# Patient Record
Sex: MALE | Employment: OTHER
[De-identification: names, ages, dates, MRNs, and addresses within clinical notes are randomized per-mention and may not be internally consistent; named-entity substitution may affect disease eponyms.]

---

## 2022-07-30 ENCOUNTER — TELEPHONE ENCOUNTER (OUTPATIENT)
Age: 87
End: 2022-07-30

## 2022-07-30 RX ORDER — SUCRALFATE 1 G/1
1 (ONE) TABLET ORAL
Qty: 0 | Refills: 4 | OUTPATIENT
Start: 2017-02-23 | End: 2017-03-01

## 2022-07-31 ENCOUNTER — TELEPHONE ENCOUNTER (OUTPATIENT)
Age: 87
End: 2022-07-31

## 2022-07-31 RX ORDER — SUCRALFATE 1 G/1
1 (ONE) TABLET ORAL
Qty: 0 | Refills: 4 | Status: ACTIVE | COMMUNITY
Start: 2017-03-01

## 2022-07-31 RX ORDER — SUCRALFATE 1 G/1
1 (ONE) TABLET ORAL
Qty: 0 | Refills: 4 | Status: ACTIVE | COMMUNITY
Start: 2017-02-23

## 2022-07-31 RX ORDER — FAMOTIDINE 10 MG
1 (ONE) TABLET ORAL
Qty: 0 | Refills: 16 | Status: ACTIVE | COMMUNITY
Start: 2017-03-01

## 2023-12-23 ENCOUNTER — APPOINTMENT (OUTPATIENT)
Dept: CARDIOLOGY | Facility: HOSPITAL | Age: 88
End: 2023-12-23
Payer: MEDICARE

## 2023-12-23 ENCOUNTER — HOSPITAL ENCOUNTER (OUTPATIENT)
Facility: HOSPITAL | Age: 88
Setting detail: OBSERVATION
Discharge: HOME | End: 2023-12-24
Attending: EMERGENCY MEDICINE | Admitting: INTERNAL MEDICINE
Payer: MEDICARE

## 2023-12-23 ENCOUNTER — APPOINTMENT (OUTPATIENT)
Dept: RADIOLOGY | Facility: HOSPITAL | Age: 88
End: 2023-12-23
Payer: MEDICARE

## 2023-12-23 DIAGNOSIS — I48.0 PAROXYSMAL ATRIAL FIBRILLATION (MULTI): ICD-10-CM

## 2023-12-23 DIAGNOSIS — R07.9 CHEST PAIN, UNSPECIFIED TYPE: Primary | ICD-10-CM

## 2023-12-23 DIAGNOSIS — R07.89 OTHER CHEST PAIN: ICD-10-CM

## 2023-12-23 LAB
ALBUMIN SERPL BCP-MCNC: 4.3 G/DL (ref 3.4–5)
ALP SERPL-CCNC: 44 U/L (ref 33–136)
ALT SERPL W P-5'-P-CCNC: 15 U/L (ref 10–52)
ANION GAP SERPL CALC-SCNC: 15 MMOL/L (ref 10–20)
AST SERPL W P-5'-P-CCNC: 26 U/L (ref 9–39)
BASOPHILS # BLD AUTO: 0.06 X10*3/UL (ref 0–0.1)
BASOPHILS NFR BLD AUTO: 1 %
BILIRUB SERPL-MCNC: 0.6 MG/DL (ref 0–1.2)
BNP SERPL-MCNC: 180 PG/ML (ref 0–99)
BUN SERPL-MCNC: 31 MG/DL (ref 6–23)
CALCIUM SERPL-MCNC: 9.2 MG/DL (ref 8.6–10.3)
CARDIAC TROPONIN I PNL SERPL HS: 6 NG/L (ref 0–20)
CARDIAC TROPONIN I PNL SERPL HS: 6 NG/L (ref 0–20)
CHLORIDE SERPL-SCNC: 102 MMOL/L (ref 98–107)
CO2 SERPL-SCNC: 24 MMOL/L (ref 21–32)
CREAT SERPL-MCNC: 1.47 MG/DL (ref 0.5–1.3)
D DIMER PPP FEU-MCNC: 428 NG/ML FEU
EOSINOPHIL # BLD AUTO: 0.28 X10*3/UL (ref 0–0.4)
EOSINOPHIL NFR BLD AUTO: 4.9 %
ERYTHROCYTE [DISTWIDTH] IN BLOOD BY AUTOMATED COUNT: 13.9 % (ref 11.5–14.5)
GFR SERPL CREATININE-BSD FRML MDRD: 46 ML/MIN/1.73M*2
GLUCOSE BLD MANUAL STRIP-MCNC: 221 MG/DL (ref 74–99)
GLUCOSE SERPL-MCNC: 143 MG/DL (ref 74–99)
HCT VFR BLD AUTO: 33.7 % (ref 41–52)
HGB BLD-MCNC: 10.9 G/DL (ref 13.5–17.5)
IMM GRANULOCYTES # BLD AUTO: 0.06 X10*3/UL (ref 0–0.5)
IMM GRANULOCYTES NFR BLD AUTO: 1 % (ref 0–0.9)
LIPASE SERPL-CCNC: 11 U/L (ref 9–82)
LYMPHOCYTES # BLD AUTO: 1.14 X10*3/UL (ref 0.8–3)
LYMPHOCYTES NFR BLD AUTO: 19.9 %
MAGNESIUM SERPL-MCNC: 1.8 MG/DL (ref 1.6–2.4)
MCH RBC QN AUTO: 30.1 PG (ref 26–34)
MCHC RBC AUTO-ENTMCNC: 32.3 G/DL (ref 32–36)
MCV RBC AUTO: 93 FL (ref 80–100)
MONOCYTES # BLD AUTO: 0.49 X10*3/UL (ref 0.05–0.8)
MONOCYTES NFR BLD AUTO: 8.6 %
NEUTROPHILS # BLD AUTO: 3.69 X10*3/UL (ref 1.6–5.5)
NEUTROPHILS NFR BLD AUTO: 64.6 %
NRBC BLD-RTO: 0 /100 WBCS (ref 0–0)
PLATELET # BLD AUTO: 229 X10*3/UL (ref 150–450)
POTASSIUM SERPL-SCNC: 4 MMOL/L (ref 3.5–5.3)
PROT SERPL-MCNC: 7.4 G/DL (ref 6.4–8.2)
RBC # BLD AUTO: 3.62 X10*6/UL (ref 4.5–5.9)
SODIUM SERPL-SCNC: 137 MMOL/L (ref 136–145)
WBC # BLD AUTO: 5.7 X10*3/UL (ref 4.4–11.3)

## 2023-12-23 PROCEDURE — 85025 COMPLETE CBC W/AUTO DIFF WBC: CPT | Performed by: EMERGENCY MEDICINE

## 2023-12-23 PROCEDURE — 99285 EMERGENCY DEPT VISIT HI MDM: CPT | Performed by: EMERGENCY MEDICINE

## 2023-12-23 PROCEDURE — 93005 ELECTROCARDIOGRAM TRACING: CPT

## 2023-12-23 PROCEDURE — 71045 X-RAY EXAM CHEST 1 VIEW: CPT | Performed by: RADIOLOGY

## 2023-12-23 PROCEDURE — 2500000004 HC RX 250 GENERAL PHARMACY W/ HCPCS (ALT 636 FOR OP/ED): Performed by: PHYSICIAN ASSISTANT

## 2023-12-23 PROCEDURE — 82947 ASSAY GLUCOSE BLOOD QUANT: CPT | Mod: 59

## 2023-12-23 PROCEDURE — 83880 ASSAY OF NATRIURETIC PEPTIDE: CPT | Performed by: PHYSICIAN ASSISTANT

## 2023-12-23 PROCEDURE — 84484 ASSAY OF TROPONIN QUANT: CPT | Performed by: EMERGENCY MEDICINE

## 2023-12-23 PROCEDURE — 2500000001 HC RX 250 WO HCPCS SELF ADMINISTERED DRUGS (ALT 637 FOR MEDICARE OP): Performed by: PHYSICIAN ASSISTANT

## 2023-12-23 PROCEDURE — 71045 X-RAY EXAM CHEST 1 VIEW: CPT

## 2023-12-23 PROCEDURE — 36415 COLL VENOUS BLD VENIPUNCTURE: CPT | Performed by: EMERGENCY MEDICINE

## 2023-12-23 PROCEDURE — 99223 1ST HOSP IP/OBS HIGH 75: CPT | Performed by: PHYSICIAN ASSISTANT

## 2023-12-23 PROCEDURE — 80053 COMPREHEN METABOLIC PANEL: CPT | Performed by: EMERGENCY MEDICINE

## 2023-12-23 PROCEDURE — 83735 ASSAY OF MAGNESIUM: CPT | Performed by: PHYSICIAN ASSISTANT

## 2023-12-23 PROCEDURE — 84484 ASSAY OF TROPONIN QUANT: CPT | Mod: 91 | Performed by: EMERGENCY MEDICINE

## 2023-12-23 PROCEDURE — 83690 ASSAY OF LIPASE: CPT | Performed by: EMERGENCY MEDICINE

## 2023-12-23 PROCEDURE — 85379 FIBRIN DEGRADATION QUANT: CPT | Performed by: PHYSICIAN ASSISTANT

## 2023-12-23 PROCEDURE — G0378 HOSPITAL OBSERVATION PER HR: HCPCS

## 2023-12-23 PROCEDURE — 2500000002 HC RX 250 W HCPCS SELF ADMINISTERED DRUGS (ALT 637 FOR MEDICARE OP, ALT 636 FOR OP/ED): Mod: MUE | Performed by: PHYSICIAN ASSISTANT

## 2023-12-23 RX ORDER — PIOGLITAZONEHYDROCHLORIDE 15 MG/1
45 TABLET ORAL DAILY
Status: DISCONTINUED | OUTPATIENT
Start: 2023-12-24 | End: 2023-12-24 | Stop reason: HOSPADM

## 2023-12-23 RX ORDER — TRAZODONE HYDROCHLORIDE 50 MG/1
50 TABLET ORAL NIGHTLY
Status: DISCONTINUED | OUTPATIENT
Start: 2023-12-23 | End: 2023-12-24 | Stop reason: HOSPADM

## 2023-12-23 RX ORDER — AMLODIPINE BESYLATE 10 MG/1
10 TABLET ORAL DAILY
Status: DISCONTINUED | OUTPATIENT
Start: 2023-12-23 | End: 2023-12-23

## 2023-12-23 RX ORDER — LOSARTAN POTASSIUM 50 MG/1
100 TABLET ORAL DAILY
Status: DISCONTINUED | OUTPATIENT
Start: 2023-12-23 | End: 2023-12-24 | Stop reason: HOSPADM

## 2023-12-23 RX ORDER — MONTELUKAST SODIUM 10 MG/1
10 TABLET ORAL NIGHTLY
Status: DISCONTINUED | OUTPATIENT
Start: 2023-12-23 | End: 2023-12-24 | Stop reason: HOSPADM

## 2023-12-23 RX ORDER — LOSARTAN POTASSIUM 50 MG/1
100 TABLET ORAL DAILY
COMMUNITY

## 2023-12-23 RX ORDER — TRAZODONE HYDROCHLORIDE 50 MG/1
50 TABLET ORAL NIGHTLY
COMMUNITY

## 2023-12-23 RX ORDER — CARVEDILOL 25 MG/1
25 TABLET ORAL
COMMUNITY

## 2023-12-23 RX ORDER — FENOFIBRATE 145 MG/1
145 TABLET, FILM COATED ORAL DAILY
COMMUNITY

## 2023-12-23 RX ORDER — ASPIRIN 81 MG/1
81 TABLET ORAL DAILY
Status: DISCONTINUED | OUTPATIENT
Start: 2023-12-23 | End: 2023-12-24 | Stop reason: HOSPADM

## 2023-12-23 RX ORDER — ONDANSETRON 4 MG/1
4 TABLET, ORALLY DISINTEGRATING ORAL EVERY 8 HOURS PRN
Status: DISCONTINUED | OUTPATIENT
Start: 2023-12-23 | End: 2023-12-24 | Stop reason: HOSPADM

## 2023-12-23 RX ORDER — INSULIN LISPRO 100 [IU]/ML
0-5 INJECTION, SOLUTION INTRAVENOUS; SUBCUTANEOUS
Status: DISCONTINUED | OUTPATIENT
Start: 2023-12-23 | End: 2023-12-24 | Stop reason: HOSPADM

## 2023-12-23 RX ORDER — TALC
3 POWDER (GRAM) TOPICAL DAILY
Status: DISCONTINUED | OUTPATIENT
Start: 2023-12-23 | End: 2023-12-24 | Stop reason: HOSPADM

## 2023-12-23 RX ORDER — FUROSEMIDE 20 MG/1
20 TABLET ORAL DAILY
COMMUNITY

## 2023-12-23 RX ORDER — LEVOTHYROXINE SODIUM 88 UG/1
88 TABLET ORAL
Status: DISCONTINUED | OUTPATIENT
Start: 2023-12-24 | End: 2023-12-24 | Stop reason: HOSPADM

## 2023-12-23 RX ORDER — DEXTROSE 50 % IN WATER (D50W) INTRAVENOUS SYRINGE
25
Status: DISCONTINUED | OUTPATIENT
Start: 2023-12-23 | End: 2023-12-24 | Stop reason: HOSPADM

## 2023-12-23 RX ORDER — DEXTROSE MONOHYDRATE 100 MG/ML
0.3 INJECTION, SOLUTION INTRAVENOUS ONCE AS NEEDED
Status: DISCONTINUED | OUTPATIENT
Start: 2023-12-23 | End: 2023-12-24 | Stop reason: HOSPADM

## 2023-12-23 RX ORDER — CARVEDILOL 25 MG/1
25 TABLET ORAL
Status: DISCONTINUED | OUTPATIENT
Start: 2023-12-23 | End: 2023-12-24 | Stop reason: HOSPADM

## 2023-12-23 RX ORDER — DULOXETIN HYDROCHLORIDE 30 MG/1
30 CAPSULE, DELAYED RELEASE ORAL DAILY
Status: DISCONTINUED | OUTPATIENT
Start: 2023-12-24 | End: 2023-12-24 | Stop reason: HOSPADM

## 2023-12-23 RX ORDER — CLONIDINE HYDROCHLORIDE 0.2 MG/1
0.2 TABLET ORAL 2 TIMES DAILY
Status: DISCONTINUED | OUTPATIENT
Start: 2023-12-23 | End: 2023-12-24 | Stop reason: HOSPADM

## 2023-12-23 RX ORDER — ENOXAPARIN SODIUM 100 MG/ML
40 INJECTION SUBCUTANEOUS EVERY 24 HOURS
Status: DISCONTINUED | OUTPATIENT
Start: 2023-12-23 | End: 2023-12-24 | Stop reason: HOSPADM

## 2023-12-23 RX ORDER — PANTOPRAZOLE SODIUM 40 MG/10ML
40 INJECTION, POWDER, LYOPHILIZED, FOR SOLUTION INTRAVENOUS
Status: DISCONTINUED | OUTPATIENT
Start: 2023-12-24 | End: 2023-12-24 | Stop reason: HOSPADM

## 2023-12-23 RX ORDER — INSULIN GLARGINE 100 [IU]/ML
25 INJECTION, SOLUTION SUBCUTANEOUS NIGHTLY
COMMUNITY

## 2023-12-23 RX ORDER — FENOFIBRATE 160 MG/1
160 TABLET ORAL DAILY
Status: DISCONTINUED | OUTPATIENT
Start: 2023-12-24 | End: 2023-12-24 | Stop reason: HOSPADM

## 2023-12-23 RX ORDER — INSULIN GLARGINE 100 [IU]/ML
10 INJECTION, SOLUTION SUBCUTANEOUS NIGHTLY
Status: DISCONTINUED | OUTPATIENT
Start: 2023-12-23 | End: 2023-12-24 | Stop reason: HOSPADM

## 2023-12-23 RX ORDER — ACETAMINOPHEN 325 MG/1
650 TABLET ORAL EVERY 4 HOURS PRN
Status: DISCONTINUED | OUTPATIENT
Start: 2023-12-23 | End: 2023-12-24 | Stop reason: HOSPADM

## 2023-12-23 RX ORDER — AMIODARONE HYDROCHLORIDE 200 MG/1
200 TABLET ORAL DAILY
Status: DISCONTINUED | OUTPATIENT
Start: 2023-12-23 | End: 2023-12-24 | Stop reason: HOSPADM

## 2023-12-23 RX ORDER — ACETAMINOPHEN 650 MG/1
650 SUPPOSITORY RECTAL EVERY 4 HOURS PRN
Status: DISCONTINUED | OUTPATIENT
Start: 2023-12-23 | End: 2023-12-24 | Stop reason: HOSPADM

## 2023-12-23 RX ORDER — AMLODIPINE BESYLATE 10 MG/1
10 TABLET ORAL DAILY
COMMUNITY

## 2023-12-23 RX ORDER — LANOLIN ALCOHOL/MO/W.PET/CERES
400 CREAM (GRAM) TOPICAL DAILY
Status: DISCONTINUED | OUTPATIENT
Start: 2023-12-23 | End: 2023-12-24 | Stop reason: HOSPADM

## 2023-12-23 RX ORDER — AMLODIPINE BESYLATE 10 MG/1
10 TABLET ORAL DAILY
Status: DISCONTINUED | OUTPATIENT
Start: 2023-12-24 | End: 2023-12-24 | Stop reason: HOSPADM

## 2023-12-23 RX ORDER — CLONIDINE HYDROCHLORIDE 0.2 MG/1
0.2 TABLET ORAL 2 TIMES DAILY
COMMUNITY

## 2023-12-23 RX ORDER — PANTOPRAZOLE SODIUM 40 MG/1
40 TABLET, DELAYED RELEASE ORAL
Status: DISCONTINUED | OUTPATIENT
Start: 2023-12-24 | End: 2023-12-24 | Stop reason: HOSPADM

## 2023-12-23 RX ORDER — LEVOTHYROXINE SODIUM 88 UG/1
88 CAPSULE ORAL
COMMUNITY

## 2023-12-23 RX ORDER — ONDANSETRON HYDROCHLORIDE 2 MG/ML
4 INJECTION, SOLUTION INTRAVENOUS EVERY 8 HOURS PRN
Status: DISCONTINUED | OUTPATIENT
Start: 2023-12-23 | End: 2023-12-24 | Stop reason: HOSPADM

## 2023-12-23 RX ORDER — DULOXETIN HYDROCHLORIDE 30 MG/1
30 CAPSULE, DELAYED RELEASE ORAL DAILY
COMMUNITY

## 2023-12-23 RX ORDER — POLYETHYLENE GLYCOL 3350 17 G/17G
17 POWDER, FOR SOLUTION ORAL DAILY
Status: DISCONTINUED | OUTPATIENT
Start: 2023-12-23 | End: 2023-12-24 | Stop reason: HOSPADM

## 2023-12-23 RX ORDER — AMIODARONE HYDROCHLORIDE 200 MG/1
200 TABLET ORAL DAILY
COMMUNITY

## 2023-12-23 RX ORDER — FUROSEMIDE 20 MG/1
20 TABLET ORAL DAILY
Status: DISCONTINUED | OUTPATIENT
Start: 2023-12-23 | End: 2023-12-24 | Stop reason: HOSPADM

## 2023-12-23 RX ORDER — ACETAMINOPHEN 160 MG/5ML
650 SOLUTION ORAL EVERY 4 HOURS PRN
Status: DISCONTINUED | OUTPATIENT
Start: 2023-12-23 | End: 2023-12-24 | Stop reason: HOSPADM

## 2023-12-23 RX ORDER — PIOGLITAZONEHYDROCHLORIDE 45 MG/1
45 TABLET ORAL DAILY
COMMUNITY

## 2023-12-23 RX ORDER — POTASSIUM CHLORIDE 20 MEQ/1
20 TABLET, EXTENDED RELEASE ORAL DAILY
COMMUNITY

## 2023-12-23 RX ORDER — ASPIRIN 81 MG/1
81 TABLET ORAL DAILY
COMMUNITY

## 2023-12-23 RX ORDER — POTASSIUM CHLORIDE 20 MEQ/1
20 TABLET, EXTENDED RELEASE ORAL DAILY
Status: DISCONTINUED | OUTPATIENT
Start: 2023-12-24 | End: 2023-12-24 | Stop reason: HOSPADM

## 2023-12-23 RX ORDER — MONTELUKAST SODIUM 10 MG/1
10 TABLET ORAL NIGHTLY
COMMUNITY

## 2023-12-23 RX ADMIN — AMIODARONE HYDROCHLORIDE 200 MG: 200 TABLET ORAL at 21:00

## 2023-12-23 RX ADMIN — POLYETHYLENE GLYCOL 3350 17 G: 17 POWDER, FOR SOLUTION ORAL at 20:58

## 2023-12-23 RX ADMIN — Medication 400 MG: at 20:59

## 2023-12-23 RX ADMIN — TRAZODONE HYDROCHLORIDE 50 MG: 50 TABLET ORAL at 20:59

## 2023-12-23 RX ADMIN — MONTELUKAST SODIUM 10 MG: 10 TABLET, FILM COATED ORAL at 21:00

## 2023-12-23 RX ADMIN — Medication 3 MG: at 20:59

## 2023-12-23 RX ADMIN — CARVEDILOL 25 MG: 25 TABLET, FILM COATED ORAL at 21:00

## 2023-12-23 RX ADMIN — CLONIDINE HYDROCHLORIDE 0.2 MG: 0.2 TABLET ORAL at 20:59

## 2023-12-23 RX ADMIN — INSULIN GLARGINE 10 UNITS: 100 INJECTION, SOLUTION SUBCUTANEOUS at 22:11

## 2023-12-23 RX ADMIN — ASPIRIN 81 MG: 81 TABLET, COATED ORAL at 20:59

## 2023-12-23 RX ADMIN — FUROSEMIDE 20 MG: 20 TABLET ORAL at 21:00

## 2023-12-23 SDOH — SOCIAL STABILITY: SOCIAL INSECURITY: WERE YOU ABLE TO COMPLETE ALL THE BEHAVIORAL HEALTH SCREENINGS?: YES

## 2023-12-23 SDOH — SOCIAL STABILITY: SOCIAL INSECURITY: HAVE YOU HAD THOUGHTS OF HARMING ANYONE ELSE?: NO

## 2023-12-23 SDOH — SOCIAL STABILITY: SOCIAL INSECURITY: ABUSE: ADULT

## 2023-12-23 SDOH — SOCIAL STABILITY: SOCIAL INSECURITY: DO YOU FEEL ANYONE HAS EXPLOITED OR TAKEN ADVANTAGE OF YOU FINANCIALLY OR OF YOUR PERSONAL PROPERTY?: NO

## 2023-12-23 SDOH — SOCIAL STABILITY: SOCIAL INSECURITY: HAS ANYONE EVER THREATENED TO HURT YOUR FAMILY OR YOUR PETS?: NO

## 2023-12-23 SDOH — SOCIAL STABILITY: SOCIAL INSECURITY: ARE THERE ANY APPARENT SIGNS OF INJURIES/BEHAVIORS THAT COULD BE RELATED TO ABUSE/NEGLECT?: NO

## 2023-12-23 SDOH — SOCIAL STABILITY: SOCIAL INSECURITY: DOES ANYONE TRY TO KEEP YOU FROM HAVING/CONTACTING OTHER FRIENDS OR DOING THINGS OUTSIDE YOUR HOME?: NO

## 2023-12-23 SDOH — SOCIAL STABILITY: SOCIAL INSECURITY: ARE YOU OR HAVE YOU BEEN THREATENED OR ABUSED PHYSICALLY, EMOTIONALLY, OR SEXUALLY BY ANYONE?: NO

## 2023-12-23 SDOH — SOCIAL STABILITY: SOCIAL INSECURITY: DO YOU FEEL UNSAFE GOING BACK TO THE PLACE WHERE YOU ARE LIVING?: NO

## 2023-12-23 ASSESSMENT — PAIN - FUNCTIONAL ASSESSMENT
PAIN_FUNCTIONAL_ASSESSMENT: 0-10

## 2023-12-23 ASSESSMENT — LIFESTYLE VARIABLES
HOW OFTEN DO YOU HAVE 6 OR MORE DRINKS ON ONE OCCASION: NEVER
HOW OFTEN DO YOU HAVE A DRINK CONTAINING ALCOHOL: NEVER
AUDIT-C TOTAL SCORE: 0
SUBSTANCE_ABUSE_PAST_12_MONTHS: NO
HOW MANY STANDARD DRINKS CONTAINING ALCOHOL DO YOU HAVE ON A TYPICAL DAY: PATIENT DOES NOT DRINK
AUDIT-C TOTAL SCORE: 0
PRESCIPTION_ABUSE_PAST_12_MONTHS: NO
SKIP TO QUESTIONS 9-10: 1

## 2023-12-23 ASSESSMENT — COGNITIVE AND FUNCTIONAL STATUS - GENERAL
WALKING IN HOSPITAL ROOM: A LITTLE
PATIENT BASELINE BEDBOUND: NO
CLIMB 3 TO 5 STEPS WITH RAILING: A LITTLE
DAILY ACTIVITIY SCORE: 23
MOBILITY SCORE: 22
MOBILITY SCORE: 22
DAILY ACTIVITIY SCORE: 23
DRESSING REGULAR LOWER BODY CLOTHING: A LITTLE
DRESSING REGULAR LOWER BODY CLOTHING: A LITTLE
WALKING IN HOSPITAL ROOM: A LITTLE
CLIMB 3 TO 5 STEPS WITH RAILING: A LITTLE

## 2023-12-23 ASSESSMENT — ENCOUNTER SYMPTOMS
DIZZINESS: 0
PALPITATIONS: 0
ABDOMINAL PAIN: 0
WHEEZING: 0
CHILLS: 0
COUGH: 0
CHEST TIGHTNESS: 0
NAUSEA: 0
DIARRHEA: 0
HEADACHES: 0
DIFFICULTY URINATING: 0
FEVER: 0
SHORTNESS OF BREATH: 0
SORE THROAT: 0
VOMITING: 0

## 2023-12-23 ASSESSMENT — COLUMBIA-SUICIDE SEVERITY RATING SCALE - C-SSRS
2. HAVE YOU ACTUALLY HAD ANY THOUGHTS OF KILLING YOURSELF?: NO
1. IN THE PAST MONTH, HAVE YOU WISHED YOU WERE DEAD OR WISHED YOU COULD GO TO SLEEP AND NOT WAKE UP?: NO
6. HAVE YOU EVER DONE ANYTHING, STARTED TO DO ANYTHING, OR PREPARED TO DO ANYTHING TO END YOUR LIFE?: NO

## 2023-12-23 ASSESSMENT — PATIENT HEALTH QUESTIONNAIRE - PHQ9
SUM OF ALL RESPONSES TO PHQ9 QUESTIONS 1 & 2: 0
1. LITTLE INTEREST OR PLEASURE IN DOING THINGS: NOT AT ALL
2. FEELING DOWN, DEPRESSED OR HOPELESS: NOT AT ALL

## 2023-12-23 ASSESSMENT — ACTIVITIES OF DAILY LIVING (ADL)
LACK_OF_TRANSPORTATION: NO
DRESSING YOURSELF: INDEPENDENT
BATHING: INDEPENDENT
HEARING - RIGHT EAR: HEARING AID
WALKS IN HOME: INDEPENDENT
JUDGMENT_ADEQUATE_SAFELY_COMPLETE_DAILY_ACTIVITIES: YES
FEEDING YOURSELF: INDEPENDENT
GROOMING: INDEPENDENT
HEARING - LEFT EAR: HEARING AID
PATIENT'S MEMORY ADEQUATE TO SAFELY COMPLETE DAILY ACTIVITIES?: YES
TOILETING: INDEPENDENT
ADEQUATE_TO_COMPLETE_ADL: NO

## 2023-12-23 ASSESSMENT — PAIN SCALES - GENERAL
PAINLEVEL_OUTOF10: 0 - NO PAIN
PAINLEVEL_OUTOF10: 2
PAINLEVEL_OUTOF10: 2
PAINLEVEL_OUTOF10: 0 - NO PAIN

## 2023-12-23 ASSESSMENT — PAIN DESCRIPTION - DESCRIPTORS
DESCRIPTORS: THROBBING
DESCRIPTORS: THROBBING

## 2023-12-23 NOTE — CARE PLAN
Problem: Pain  Goal: My pain/discomfort is manageable  Outcome: Progressing     Problem: Safety  Goal: Patient will be injury free during hospitalization  Outcome: Progressing  Goal: I will remain free of falls  Outcome: Progressing     Problem: Daily Care  Goal: Daily care needs are met  Outcome: Progressing     Problem: Psychosocial Needs  Goal: Demonstrates ability to cope with hospitalization/illness  Outcome: Progressing  Goal: Collaborate with me, my family, and caregiver to identify my specific goals  Outcome: Progressing  Flowsheets (Taken 12/23/2023 1072)  Cultural Requests During Hospitalization: n/a  Spiritual Requests During Hospitalization: n/a     Problem: Discharge Barriers  Goal: My discharge needs are met  Outcome: Progressing   The patient's goals for the shift include      The clinical goals for the shift include Remain free from falls

## 2023-12-23 NOTE — ED NOTES
Pt BIBA with the substernal chest pressure. Pt was given nitroglycerin tablet and 324mg ASA. Pt to room 22, placed on cardiac monitor with cycling BP's and continuous pulse oximetry. EKG completed at bedside, blood work sent to lab.      Jaun Rodriguez RN  12/23/23 0437

## 2023-12-23 NOTE — CARE PLAN
The patient's goals for the shift include  pt. Will be cp free this shift.    The clinical goals for the shift include Remain free from falls this shift.  Pt. Is a new admission to the unit. Oriented to room, and bed controls. . Resting in bed.

## 2023-12-23 NOTE — H&P
History Of Present Illness  Sonny Heart is a 88 y.o. male PMHx HTN, pAF s/p PPM, HFpEF, GERD, DM2, HLD, CKD3 presenting with chest pain. Patient reports intermittent substernal chest pain x 2 weeks which worsened acute overnight. Unclear trigger. He reports that this is not caused by activity, feels the pain at rest. Not worse with lying down. He reports the pain has become more persistent. Denies fall or new exercises. Denies pleuritic chest pain. He denies dizziness or palpitations. Has been eating and drinking normally. Denies fever, chills, nausea, vomiting. Dneies neck/jaw/arm pain.    ED workup: EKG paced rhythm, not ST T wave abnormalities disgnostic for ischemia; CXR no acute cardiopulmonary abnormality; trop 6, 6; CBC no leukocytosis, Hgb 10.9, no thrombocytopenia; CMP Cr 1.47 (1.5-1.7), LFTs wnl, electrolytes wnl; lipase 11    Reviewed  Nuclear stress 5/2022 normal myocardial study without evidence of ischemia.   ECHO 11/11/2022 LVEF 60-65%, elevated RSVP     Past Medical History  No past medical history on file.    Surgical History  No past surgical history on file.     Social History  He has no history on file for tobacco use, alcohol use, and drug use.    Family History  No family history on file.     Allergies  Patient has no allergy information on record.    Review of Systems   Constitutional:  Negative for chills and fever.   HENT:  Negative for congestion and sore throat.    Eyes:  Negative for visual disturbance.   Respiratory:  Negative for cough, chest tightness, shortness of breath and wheezing.    Cardiovascular:  Positive for chest pain. Negative for palpitations and leg swelling.   Gastrointestinal:  Negative for abdominal pain, diarrhea, nausea and vomiting.   Genitourinary:  Negative for difficulty urinating.   Neurological:  Negative for dizziness and headaches.        Physical Exam  Constitutional:       General: He is not in acute distress.     Appearance: He is not toxic-appearing.    HENT:      Head: Normocephalic and atraumatic.      Right Ear: External ear normal.      Left Ear: External ear normal.      Nose: Nose normal. No congestion.      Mouth/Throat:      Mouth: Mucous membranes are moist.      Pharynx: Oropharynx is clear.   Eyes:      General:         Right eye: No discharge.         Left eye: No discharge.      Conjunctiva/sclera: Conjunctivae normal.      Pupils: Pupils are equal, round, and reactive to light.   Cardiovascular:      Rate and Rhythm: Normal rate and regular rhythm.      Heart sounds: No murmur heard.     No gallop.   Pulmonary:      Effort: Pulmonary effort is normal.      Breath sounds: No wheezing or rales.   Abdominal:      General: Abdomen is flat. Bowel sounds are normal.      Palpations: Abdomen is soft.      Tenderness: There is no abdominal tenderness. There is no guarding or rebound.   Musculoskeletal:         General: No swelling or tenderness. Normal range of motion.      Right lower leg: No edema.      Left lower leg: No edema.   Skin:     General: Skin is warm and dry.      Findings: No erythema or rash.   Neurological:      General: No focal deficit present.      Mental Status: He is alert.      Cranial Nerves: No cranial nerve deficit.      Motor: No weakness.   Psychiatric:         Mood and Affect: Mood normal.         Thought Content: Thought content normal.          Last Recorded Vitals  Blood pressure 173/64, pulse 68, temperature 37 °C (98.6 °F), resp. rate 14, weight 77.1 kg (169 lb 15.6 oz), SpO2 100 %.    Relevant Results    Scheduled medications  amiodarone, 200 mg, oral, Daily  [START ON 12/24/2023] amLODIPine, 10 mg, oral, Daily  aspirin, 81 mg, oral, Daily  carvedilol, 25 mg, oral, BID with meals  cloNIDine, 0.2 mg, oral, BID  DULoxetine, 30 mg, oral, Daily  enoxaparin, 40 mg, subcutaneous, q24h  [START ON 12/24/2023] fenofibrate, 160 mg, oral, Daily  furosemide, 20 mg, oral, Daily  insulin glargine, 10 Units, subcutaneous,  Nightly  insulin lispro, 0-5 Units, subcutaneous, TID with meals  [START ON 12/24/2023] levothyroxine, 88 mcg, oral, Daily before breakfast  [Held by provider] losartan, 100 mg, oral, Daily  magnesium oxide, 400 mg, oral, Daily  melatonin, 3 mg, oral, Daily  montelukast, 10 mg, oral, Nightly  [START ON 12/24/2023] pantoprazole, 40 mg, oral, Daily before breakfast   Or  [START ON 12/24/2023] pantoprazole, 40 mg, intravenous, Daily before breakfast  [START ON 12/24/2023] pioglitazone, 45 mg, oral, Daily  polyethylene glycol, 17 g, oral, Daily  [START ON 12/24/2023] potassium chloride CR, 20 mEq, oral, Daily  traZODone, 50 mg, oral, Nightly      Continuous medications     PRN medications  PRN medications: acetaminophen **OR** acetaminophen **OR** acetaminophen, dextrose 10 % in water (D10W), dextrose, glucagon, ondansetron ODT **OR** ondansetron  Results for orders placed or performed during the hospital encounter of 12/23/23 (from the past 24 hour(s))   CBC with Differential   Result Value Ref Range    WBC 5.7 4.4 - 11.3 x10*3/uL    nRBC 0.0 0.0 - 0.0 /100 WBCs    RBC 3.62 (L) 4.50 - 5.90 x10*6/uL    Hemoglobin 10.9 (L) 13.5 - 17.5 g/dL    Hematocrit 33.7 (L) 41.0 - 52.0 %    MCV 93 80 - 100 fL    MCH 30.1 26.0 - 34.0 pg    MCHC 32.3 32.0 - 36.0 g/dL    RDW 13.9 11.5 - 14.5 %    Platelets 229 150 - 450 x10*3/uL    Neutrophils % 64.6 40.0 - 80.0 %    Immature Granulocytes %, Automated 1.0 (H) 0.0 - 0.9 %    Lymphocytes % 19.9 13.0 - 44.0 %    Monocytes % 8.6 2.0 - 10.0 %    Eosinophils % 4.9 0.0 - 6.0 %    Basophils % 1.0 0.0 - 2.0 %    Neutrophils Absolute 3.69 1.60 - 5.50 x10*3/uL    Immature Granulocytes Absolute, Automated 0.06 0.00 - 0.50 x10*3/uL    Lymphocytes Absolute 1.14 0.80 - 3.00 x10*3/uL    Monocytes Absolute 0.49 0.05 - 0.80 x10*3/uL    Eosinophils Absolute 0.28 0.00 - 0.40 x10*3/uL    Basophils Absolute 0.06 0.00 - 0.10 x10*3/uL   Comprehensive Metabolic Panel   Result Value Ref Range    Glucose 143 (H)  74 - 99 mg/dL    Sodium 137 136 - 145 mmol/L    Potassium 4.0 3.5 - 5.3 mmol/L    Chloride 102 98 - 107 mmol/L    Bicarbonate 24 21 - 32 mmol/L    Anion Gap 15 10 - 20 mmol/L    Urea Nitrogen 31 (H) 6 - 23 mg/dL    Creatinine 1.47 (H) 0.50 - 1.30 mg/dL    eGFR 46 (L) >60 mL/min/1.73m*2    Calcium 9.2 8.6 - 10.3 mg/dL    Albumin 4.3 3.4 - 5.0 g/dL    Alkaline Phosphatase 44 33 - 136 U/L    Total Protein 7.4 6.4 - 8.2 g/dL    AST 26 9 - 39 U/L    Bilirubin, Total 0.6 0.0 - 1.2 mg/dL    ALT 15 10 - 52 U/L   Troponin I, High Sensitivity, Initial   Result Value Ref Range    Troponin I, High Sensitivity 6 0 - 20 ng/L   B-type natriuretic peptide   Result Value Ref Range     (H) 0 - 99 pg/mL   Troponin, High Sensitivity, 1 Hour   Result Value Ref Range    Troponin I, High Sensitivity 6 0 - 20 ng/L   Lipase   Result Value Ref Range    Lipase 11 9 - 82 U/L   Magnesium   Result Value Ref Range    Magnesium 1.80 1.60 - 2.40 mg/dL     XR chest 1 view    Result Date: 12/23/2023  Interpreted By:  David Riley, STUDY: XR CHEST 1 VIEW;  12/23/2023 10:25 am   INDICATION: Signs/Symptoms:cp.   COMPARISON: 11/18/2022   ACCESSION NUMBER(S): [id number]   ORDERING CLINICIAN: CYNTHIA ECHOLS   FINDINGS: Pacemaker leads are intact and properly positioned.       CARDIOMEDIASTINAL SILHOUETTE: Cardiomediastinal silhouette is normal in size and configuration.   LUNGS: Fibrotic changes left lung base.   ABDOMEN: No remarkable upper abdominal findings.   BONES: No acute osseous changes.       1.  No evidence of acute cardiopulmonary process.       MACRO: None   Signed by: David Riley 12/23/2023 11:22 AM Dictation workstation:   [id number]    OCT MACULA CIRRUS OU (BOTH EYES)    Result Date: 11/24/2023  Date of Procedure 11/24/2023. Technician Information Imaging Technician: vat. Interpretation Right Eye Abnormal foveal contour. Findings include CNV; Negative for Intraretinal fluid, Cystoid macular edema, Subretinal fluid. Notes  Unable OS            Assessment/Plan   Principal Problem:    Chest pain  Sonny Heart is a 88 y.o. male PMHx HTN, pAF s/p PPM, HFpEF, GERD, DM2, HLD, CKD3 presenting with chest pain. Patient reports intermittent substernal chest pain x 2 weeks which worsened acute overnight. Unclear trigger. He reports that this is not caused by activity, feels the pain at rest. Not worse with lying down. He reports the pain has become more persistent. Denies fall or new exercises. Denies pleuritic chest pain. He denies dizziness or palpitations. Has been eating and drinking normally. Denies fever, chills, nausea, vomiting. Dneies neck/jaw/arm pain.    ED workup: EKG paced rhythm, not ST T wave abnormalities disgnostic for ischemia; CXR no acute cardiopulmonary abnormality; trop 6, 6; CBC no leukocytosis, Hgb 10.9, no thrombocytopenia; CMP Cr 1.47 (1.5-1.7), LFTs wnl, electrolytes wnl; lipase 11    Reviewed  Nuclear stress 5/2022 normal myocardial study without evidence of ischemia.   ECHO 11/11/2022 LVEF 60-65%, elevated RSVP    Chest pain  A.fib s/p PPM  HFpEF  HTN  -CBC: no leukocytosis, no acute anemia, no thrombocytopenia  -CMP: no acute electrolyte abnormalities, LFTs wnl, Cr 1.47 (1.5-1.7)  -CXR no acute cardiopulmonary abnormality  -EKG paced rhythm, no ST T wave changes diagnostic for ischemia  -  -Mg 1.8  -D dimer pending   -trop 6, 6  -Pacer interrogation  -continue amiodarone  -continue BP meds  -monitor on tele  -maintain K > 4.0 and Mg > 2.0  --> replete PRN   -cardiac diet  -check Hba1c/lipid panel  -ASA and statin  -NPO after midnight  -appreciate cardiology consult    CKD  -stable  -cr 1.47 (baseline 1.5-1.7)  -trend BMP    T2DM  -accucheck  -hypoglycemia order set  -appears he is on insulin glargine 25 U HS    -will start with 10 U HS  -SS lispro    GI ppx  -protonix    VTE ppx  -Lovenox       I spent 60 minutes in the professional and overall care of this patient.      Eladio Espinal PA-C

## 2023-12-24 VITALS
SYSTOLIC BLOOD PRESSURE: 124 MMHG | DIASTOLIC BLOOD PRESSURE: 59 MMHG | TEMPERATURE: 97.1 F | HEART RATE: 59 BPM | BODY MASS INDEX: 25.12 KG/M2 | RESPIRATION RATE: 18 BRPM | OXYGEN SATURATION: 97 % | WEIGHT: 169.97 LBS

## 2023-12-24 LAB
ANION GAP SERPL CALC-SCNC: 14 MMOL/L (ref 10–20)
BUN SERPL-MCNC: 23 MG/DL (ref 6–23)
CALCIUM SERPL-MCNC: 9 MG/DL (ref 8.6–10.3)
CARDIAC TROPONIN I PNL SERPL HS: 19 NG/L (ref 0–20)
CHLORIDE SERPL-SCNC: 103 MMOL/L (ref 98–107)
CHOLEST SERPL-MCNC: 163 MG/DL (ref 0–199)
CHOLESTEROL/HDL RATIO: 4.5
CO2 SERPL-SCNC: 24 MMOL/L (ref 21–32)
CREAT SERPL-MCNC: 1.35 MG/DL (ref 0.5–1.3)
ERYTHROCYTE [DISTWIDTH] IN BLOOD BY AUTOMATED COUNT: 14.1 % (ref 11.5–14.5)
GFR SERPL CREATININE-BSD FRML MDRD: 50 ML/MIN/1.73M*2
GLUCOSE BLD MANUAL STRIP-MCNC: 153 MG/DL (ref 74–99)
GLUCOSE SERPL-MCNC: 114 MG/DL (ref 74–99)
HCT VFR BLD AUTO: 31.1 % (ref 41–52)
HDLC SERPL-MCNC: 36.6 MG/DL
HGB BLD-MCNC: 10.1 G/DL (ref 13.5–17.5)
LDLC SERPL CALC-MCNC: 86 MG/DL
MAGNESIUM SERPL-MCNC: 1.8 MG/DL (ref 1.6–2.4)
MCH RBC QN AUTO: 30.3 PG (ref 26–34)
MCHC RBC AUTO-ENTMCNC: 32.5 G/DL (ref 32–36)
MCV RBC AUTO: 93 FL (ref 80–100)
NON HDL CHOLESTEROL: 126 MG/DL (ref 0–149)
NRBC BLD-RTO: 0 /100 WBCS (ref 0–0)
PLATELET # BLD AUTO: 211 X10*3/UL (ref 150–450)
POTASSIUM SERPL-SCNC: 3.4 MMOL/L (ref 3.5–5.3)
RBC # BLD AUTO: 3.33 X10*6/UL (ref 4.5–5.9)
SODIUM SERPL-SCNC: 138 MMOL/L (ref 136–145)
TRIGL SERPL-MCNC: 200 MG/DL (ref 0–149)
VLDL: 40 MG/DL (ref 0–40)
WBC # BLD AUTO: 6.8 X10*3/UL (ref 4.4–11.3)

## 2023-12-24 PROCEDURE — 36415 COLL VENOUS BLD VENIPUNCTURE: CPT | Performed by: PHYSICIAN ASSISTANT

## 2023-12-24 PROCEDURE — 85027 COMPLETE CBC AUTOMATED: CPT | Performed by: PHYSICIAN ASSISTANT

## 2023-12-24 PROCEDURE — 99223 1ST HOSP IP/OBS HIGH 75: CPT | Performed by: NURSE PRACTITIONER

## 2023-12-24 PROCEDURE — G0378 HOSPITAL OBSERVATION PER HR: HCPCS

## 2023-12-24 PROCEDURE — 82947 ASSAY GLUCOSE BLOOD QUANT: CPT

## 2023-12-24 PROCEDURE — 2500000001 HC RX 250 WO HCPCS SELF ADMINISTERED DRUGS (ALT 637 FOR MEDICARE OP): Performed by: PHYSICIAN ASSISTANT

## 2023-12-24 PROCEDURE — 99222 1ST HOSP IP/OBS MODERATE 55: CPT | Performed by: INTERNAL MEDICINE

## 2023-12-24 PROCEDURE — 83735 ASSAY OF MAGNESIUM: CPT | Performed by: NURSE PRACTITIONER

## 2023-12-24 PROCEDURE — 2500000004 HC RX 250 GENERAL PHARMACY W/ HCPCS (ALT 636 FOR OP/ED): Mod: MUE | Performed by: PHYSICIAN ASSISTANT

## 2023-12-24 PROCEDURE — 2500000002 HC RX 250 W HCPCS SELF ADMINISTERED DRUGS (ALT 637 FOR MEDICARE OP, ALT 636 FOR OP/ED): Mod: MUE | Performed by: PHYSICIAN ASSISTANT

## 2023-12-24 PROCEDURE — 80061 LIPID PANEL: CPT | Performed by: NURSE PRACTITIONER

## 2023-12-24 PROCEDURE — 80048 BASIC METABOLIC PNL TOTAL CA: CPT | Performed by: PHYSICIAN ASSISTANT

## 2023-12-24 PROCEDURE — 84484 ASSAY OF TROPONIN QUANT: CPT | Performed by: PHYSICIAN ASSISTANT

## 2023-12-24 RX ORDER — POTASSIUM CHLORIDE 20 MEQ/1
20 TABLET, EXTENDED RELEASE ORAL ONCE
Status: DISCONTINUED | OUTPATIENT
Start: 2023-12-24 | End: 2023-12-24 | Stop reason: HOSPADM

## 2023-12-24 RX ORDER — REGADENOSON 0.08 MG/ML
0.4 INJECTION, SOLUTION INTRAVENOUS
Status: DISCONTINUED | OUTPATIENT
Start: 2023-12-24 | End: 2023-12-24 | Stop reason: HOSPADM

## 2023-12-24 RX ADMIN — FUROSEMIDE 20 MG: 20 TABLET ORAL at 09:20

## 2023-12-24 RX ADMIN — POTASSIUM CHLORIDE 20 MEQ: 1500 TABLET, EXTENDED RELEASE ORAL at 09:20

## 2023-12-24 RX ADMIN — PIOGLITAZONE 45 MG: 15 TABLET ORAL at 11:47

## 2023-12-24 RX ADMIN — Medication 400 MG: at 09:23

## 2023-12-24 RX ADMIN — CARVEDILOL 25 MG: 25 TABLET, FILM COATED ORAL at 09:22

## 2023-12-24 RX ADMIN — LEVOTHYROXINE SODIUM 88 MCG: 88 TABLET ORAL at 06:50

## 2023-12-24 RX ADMIN — FENOFIBRATE 160 MG: 160 TABLET ORAL at 09:19

## 2023-12-24 RX ADMIN — LOSARTAN POTASSIUM 100 MG: 50 TABLET, FILM COATED ORAL at 09:22

## 2023-12-24 RX ADMIN — PANTOPRAZOLE SODIUM 40 MG: 40 TABLET, DELAYED RELEASE ORAL at 06:51

## 2023-12-24 RX ADMIN — ASPIRIN 81 MG: 81 TABLET, COATED ORAL at 09:20

## 2023-12-24 RX ADMIN — DULOXETINE HYDROCHLORIDE 30 MG: 30 CAPSULE, DELAYED RELEASE ORAL at 09:22

## 2023-12-24 RX ADMIN — AMLODIPINE BESYLATE 10 MG: 10 TABLET ORAL at 09:20

## 2023-12-24 RX ADMIN — AMIODARONE HYDROCHLORIDE 200 MG: 200 TABLET ORAL at 09:20

## 2023-12-24 RX ADMIN — ACETAMINOPHEN 650 MG: 325 TABLET ORAL at 09:19

## 2023-12-24 RX ADMIN — CLONIDINE HYDROCHLORIDE 0.2 MG: 0.2 TABLET ORAL at 09:22

## 2023-12-24 ASSESSMENT — HEART SCORE
AGE: 65+
TROPONIN: 1-3 TIMES NORMAL LIMIT
ECG: NON-SPECIFIC REPOLARIZATION DISTURBANCE
HEART SCORE: 6
HISTORY: MODERATELY SUSPICIOUS
RISK FACTORS: 1-2 RISK FACTORS

## 2023-12-24 ASSESSMENT — ACTIVITIES OF DAILY LIVING (ADL): LACK_OF_TRANSPORTATION: NO

## 2023-12-24 NOTE — DISCHARGE SUMMARY
Discharge Diagnosis  Chest pain    Issues Requiring Follow-Up  pAF s/p PPM  HFpEF  HTN    Discharge Meds     Your medication list        CONTINUE taking these medications        Instructions Last Dose Given Next Dose Due   amiodarone 200 mg tablet  Commonly known as: Pacerone           amLODIPine 10 mg tablet  Commonly known as: Norvasc           aspirin 81 mg EC tablet           carvedilol 25 mg tablet  Commonly known as: Coreg           cloNIDine 0.2 mg tablet  Commonly known as: Catapres           DULoxetine 30 mg DR capsule  Commonly known as: Cymbalta           fenofibrate 145 mg tablet  Commonly known as: Tricor           furosemide 20 mg tablet  Commonly known as: Lasix           Lantus U-100 Insulin 100 unit/mL injection  Generic drug: insulin glargine           levothyroxine 88 mcg capsule  Commonly known as: Tirosint           losartan 50 mg tablet  Commonly known as: Cozaar           montelukast 10 mg tablet  Commonly known as: Singulair           pioglitazone 45 mg tablet  Commonly known as: Actos           potassium chloride CR 20 mEq ER tablet  Commonly known as: Klor-Con M20           traZODone 50 mg tablet  Commonly known as: Desyrel                    Test Results Pending At Discharge  Pending Labs       No current pending labs.            Hospital Course   Sonny Heart is a 88 y.o. male PMHx HTN, pAF s/p PPM, HFpEF, GERD, DM2, HLD, CKD3 presenting with chest pain. Patient reports intermittent substernal chest pain x 2 weeks which worsened acute overnight. Unclear trigger. He reports that this is not caused by activity, feels the pain at rest. Not worse with lying down. He reports the pain has become more persistent. Denies fall or new exercises. Denies pleuritic chest pain. He denies dizziness or palpitations. Has been eating and drinking normally. Denies fever, chills, nausea, vomiting. Dneies neck/jaw/arm pain.     ED workup: EKG paced rhythm, not ST T wave abnormalities disgnostic for  ischemia; CXR no acute cardiopulmonary abnormality; trop 6, 6; CBC no leukocytosis, Hgb 10.9, no thrombocytopenia; CMP Cr 1.47 (1.5-1.7), LFTs wnl, electrolytes wnl; lipase 11     Patient remained HDS throughout hospital course. Chest pain resolved. He was evaluated by cardiology, ruled out for ACS. Cardiology NP Bill to schedule stress testing for patient. Resume home medications as prescribed. All questions answered, patient in agreement with plan of care.    Reviewed  Nuclear stress 5/2022 normal myocardial study without evidence of ischemia.   ECHO 11/11/2022 LVEF 60-65%, elevated RSVP     Chest pain  A.fib s/p PPM  HFpEF  HTN  -CBC: no leukocytosis, no acute anemia, no thrombocytopenia  -CMP: no acute electrolyte abnormalities, LFTs wnl, Cr 1.47 (1.5-1.7)  -CXR no acute cardiopulmonary abnormality  -EKG paced rhythm, no ST T wave changes diagnostic for ischemia  -  -Mg 1.8  -D dimer pending   -trop 6, 6  -Pacer interrogation  -continue amiodarone  -continue BP meds  -monitor on tele  -maintain K > 4.0 and Mg > 2.0  --> replete PRN   -cardiac diet  -check Hba1c/lipid panel  -ASA and statin  -NPO after midnight  -appreciate cardiology consult     CKD  -stable  -cr 1.47 (baseline 1.5-1.7)  -trend BMP     T2DM  -accucheck  -hypoglycemia order set  -appears he is on insulin glargine 25 U HS    -will start with 10 U HS  -SS lispro     GI ppx  -protonix     VTE ppx  -Lovenox      Discussed results labs imaging with Dr. Gomez.     I spent 60 minutes in the professional and overall care of this patient.    Pertinent Physical Exam At Time of Discharge  Physical Exam  Constitutional:       General: He is not in acute distress.     Appearance: He is not toxic-appearing.   HENT:      Head: Normocephalic and atraumatic.      Right Ear: External ear normal.      Left Ear: External ear normal.      Nose: Nose normal. No congestion.      Mouth/Throat:      Mouth: Mucous membranes are moist.      Pharynx: Oropharynx is  clear.   Eyes:      General:         Right eye: No discharge.         Left eye: No discharge.      Conjunctiva/sclera: Conjunctivae normal.      Pupils: Pupils are equal, round, and reactive to light.   Cardiovascular:      Rate and Rhythm: Normal rate and regular rhythm.      Heart sounds: No murmur heard.     No gallop.   Pulmonary:      Effort: Pulmonary effort is normal.      Breath sounds: No wheezing or rales.   Abdominal:      General: Abdomen is flat. Bowel sounds are normal.      Palpations: Abdomen is soft.      Tenderness: There is no abdominal tenderness. There is no guarding or rebound.   Musculoskeletal:         General: No swelling or tenderness. Normal range of motion.      Right lower leg: No edema.      Left lower leg: No edema.   Skin:     General: Skin is warm and dry.      Findings: No erythema or rash.   Neurological:      General: No focal deficit present.      Mental Status: He is alert.      Cranial Nerves: No cranial nerve deficit.      Motor: No weakness.   Psychiatric:         Mood and Affect: Mood normal.         Thought Content: Thought content normal.         Outpatient Follow-Up  No future appointments.      Eladio Espinal PA-C

## 2023-12-24 NOTE — DISCHARGE INSTRUCTIONS
Resume home medications as prescribed  Cardiology recommends outpatient stress testing as soon as possible  Please follow up with your cardiologist to schedule  Return to care for worsening chest pain

## 2023-12-24 NOTE — PROGRESS NOTES
12/24/23 1144   [facility] Disability Status   Are you deaf or do you have serious difficulty hearing? N   Are you blind or do you have serious difficulty seeing, even when wearing glasses? N   Because of a physical, mental, or emotional condition, do you have serious difficulty concentrating, remembering, or making decisions? (5 years old or older) N   Do you have serious difficulty walking or climbing stairs? Y  (walker)   Do you have serious difficulty dressing or bathing? N   Because of a physical, mental, or emotional condition, do you have serious difficulty doing errands alone such as visiting the doctor? Y

## 2023-12-24 NOTE — ED PROVIDER NOTES
HPI   Chief Complaint   Patient presents with   • Chest Pain     CHEST PAIN, PACED RHYTHM WAS GIVEN 1 nitroglycerin 324 ASA EN ROUTE       HPI: []  88-year-old white male with a history of hypertension, has a pacemaker today comes in with chest pain.  He states for the last 24 hours he may have intermittent chest pain.  He describes lower sternal upper abdomen.  EMS was called given aspirin and nitroglycerin with good pain relief.  Currently pain-free.  Describes pain is burning/sharp chest heaviness lower chest upper abdomen.  Nonradiating.  No diaphoresis no shortness breath.  No syncope or near syncope no hematemesis melena hematochezia no hemoptysis no recent travel hospitalization or antibiotics.  No history of known CAD in the past.    Past history: Hypertension, pacemaker placement  Social: Patient denies current tobacco alcohol drug abuse.  REVIEW OF SYSTEMS:    GENERAL.: No weight loss, fatigue, anorexia, insomnia, fever.    EYES: No vision loss, double vision, drainage, eye pain.    ENT: No pharyngitis, dry mouth.    CARDIOPULMONARY: Positive for chest pain, palpitations, syncope, near syncope. No shortness of breath, cough, hemoptysis.    GI: No abdominal pain, change in bowel habits, melena, hematemesis, hematochezia, nausea, vomiting, diarrhea.    : No discharge, dysuria, frequency, urgency, hematuria.    MS: No limb pain, joint pain, joint swelling.    SKIN: No rashes.    PSYCH: No depression, anxiety, suicidality, homicidality.    Review of systems is otherwise negative unless stated above or in history of present illness.  Social history, family history, allergies reviewed.  PHYSICAL EXAM:    GENERAL: Vitals noted, no distress. Alert and oriented  x 3. Non-toxic.      EENT: TMs clear. Posterior oropharynx unremarkable. No meningismus. No LAD.     NECK: Supple. Nontender. No midline tenderness.     CARDIAC: Regular, rate, rhythm. No murmurs rubs or gallops. No JVD    PULMONARY: Lungs clear  bilaterally with good aeration. No wheezes rales or rhonchi. No respiratory distress.  No tachypnea stridor or retractions able to speak in full sentences    ABDOMEN: Soft, nonsurgical.  Minimal epigastric tenderness no rebound or guarding no peritoneal signs. Normoactive bowel sounds. No pulsatile masses.  Negative CVA tenderness    EXTREMITIES: No peripheral edema. Negative Homans bilaterally, no cords.  2+ bounding pulses well-perfused    SKIN: No rash. Intact.     NEURO: No focal neurologic deficits, NIH score of 0. Cranial nerves normal as tested from II through XII.     MEDICAL DECISION MAKING:  EKG on my interpretation shows a paced rhythm wide QRS complexes rate in the mid 70s with no acute ischemic changes.    CBC with differential shows no leukocytosis chemistries show mild kidney disease LFTs unremarkable  elevated chest x-ray negative troponin delta troponin negative.    Treatment in ED: IV established, placed on a cardiac monitor.    ED course: Patient was having intermittent chest pain.    Impression: Chest pain    Plan/MDM: Elderly male history of hypertension pacemaker placement comes in with intermittent chest pain although troponin negative which is reassuring but given his ongoing symptoms patient will be hospitalized for serial troponins EKGs and further cardiac evaluation low suspicion for ACS dissection or pulm embolism.                          [place] Coma Scale Score: 15                  Patient History   No past medical history on file.  No past surgical history on file.  No family history on file.  Social History     Tobacco Use   • Smoking status: Former     Types: Cigarettes   • Smokeless tobacco: Never   Substance Use Topics   • Alcohol use: Not on file   • Drug use: Not on file       Physical Exam   ED Triage Vitals   Temp Heart Rate Resp BP   12/23/23 0907 12/23/23 0907 12/23/23 0907 12/23/23 0907   37 °C (98.6 °F) 80 16 154/62      SpO2 Temp Source Heart Rate Source Patient  Position   12/23/23 0907 12/23/23 1636 12/23/23 1636 12/23/23 1636   98 % Temporal Monitor Lying      BP Location FiO2 (%)     12/23/23 1636 --     Right arm        Physical Exam    ED Course & MDM   ED Course as of 12/24/23 0643   Sat Dec 23, 2023   1600 Patient's troponin and delta troponin is negative labs reviewed discussed with the patient given his ongoing intermittent chest pain patient will be hospitalized for further care. [MT]      ED Course User Index  [MT] Ilir Cruz MD         Diagnoses as of 12/24/23 0643   Chest pain, unspecified type       Medical Decision Making      Procedure  Procedures     Ilir Cruz MD  12/24/23 0643

## 2023-12-24 NOTE — PROGRESS NOTES
Amrit WIGGINS with wife in [place]     12/24/23 1142   Current Planned Discharge Disposition   Current Planned Discharge Disposition Home

## 2023-12-24 NOTE — CONSULTS
"Inpatient consult to Cardiology  Consult performed by: Catrina Khan, APRN-CNP  Consult ordered by: Eladio Espinal PA-C  Reason for consult: cp        History Of Present Illness:    Sonny Oakes is a 88 y.o. male with past medical history significant for hypertension, paroxysmal atrial fibrillation, permanent pacemaker, heart failure preserved ejection fraction, GERD, diabetes type 2, dyslipidemia, CKD stage III presenting to emergency room complaining of chest pain.  Ruled out for MI.  Cardiology is consulted for further evaluation of chest pain.    Patient reports chest pain 2 to 3 days before admission.  Unfortunately he does not have his hearing aids and suffers from hearing impairment.  This makes communication with him very difficult.  States he has been having chest pain unrelated to exertion on and off at times lasting hours.  No other associated symptoms or radiation to back, jaw or upper extremities.  He has had indigestion chest discomfort before but does not think this is similar to that.  He was not sure why he continued episodes of chest pain therefore he came to emergency room.    He denies any prior myocardial infarction.  Follows with Dr. Wilhelm at [facility] for cardiology care.  Denies any chest discomfort since admission to the hospital.      Past Cardiology Tests (Last 3 Years):  EKG:  No results found for this or any previous visit.    Echo:  No results found for this or any previous visit.    Ejection Fractions:  No results found for: \"EF\"  Cath:  No results found for this or any previous visit.    Stress Test:  Results for orders placed in visit on 05/17/22    Nuclear Stress Test    Narrative  MRN: [id number]  Patient Name: SONNY OAKES    STUDY:  CARDIAC STRESS/REST INJECTION; CARDIAC STRESS/REST (MYOCARDIAL  PERFUSION/MIBI); PART 2 STRESS OR REST (NO CHARGE);  5/18/2022 3:28  pm; 5/18/2022 3:29 pm; 5/18/2022 3:30 pm    INDICATION:  Chest pain with exertion " .    COMPARISON:  None.    ACCESSION NUMBER(S):  [id number]; [id number]; [id number]    ORDERING CLINICIAN:  MIRA LEES    TECHNIQUE:  DIVISION OF NUCLEAR MEDICINE  PHARMACOLOGIC STRESS MYOCARDIAL PERFUSION SCAN, ONE DAY PROTOCOL    The patient received an intravenous dose of  10.8 mCi of Tc-99m  Myoview and resting emission tomographic (SPECT) images of the  myocardium were acquired. The patient then received an intravenous  infusion of 0.4mg regadenoson (Lexiscan)  followed by an additional  dose of  34.0 mCi of Tc-99m  Myoview. Stress phase SPECT images of  the myocardium were then acquired. These included ECG-gated images to  assess and quantify ventricular function.  A low-dose, nondiagnostic  regional CT was utilized for attenuation correction purposes.    FINDINGS:  Both stress and rest studies demonstrate grossly normal perfusion  throughout the left ventricle.    The left ventricle is normal in size.    Gated images demonstrate normal LV wall motion with an LV EF  estimated at greater than 65% at both rest and post stress.    Low-dose attenuation correction CT images are below visual diagnostic  resolution.    Impression  1.  Normal myocardial perfusion study without evidence of ischemia or  prior infarction.  2. The left ventricle is normal in size.  3. Normal LV wall motion with an LV EF estimated at greater than 65%.      I personally reviewed the images/study and I agree with the findings  as stated. This study was interpreted at [facility], [place], [place].    Cardiac Imaging:  No results found for this or any previous visit.      Past Medical History:  He has no past medical history on file.    Past Surgical History:  He has no past surgical history on file.      Social History:  He reports that he has quit smoking. His smoking use included cigarettes. He has never used smokeless tobacco. No history on file for alcohol use and drug use.    Family History:  No family  "history on file.     Allergies:  Patient has no known allergies.    ROS:  10 point review of systems including (Constitutional, Eyes, ENMT, Respiratory, Cardiac, Gastrointestinal, Neurological, Psychiatric, and Hematologic) was performed and is otherwise negative.    Objective Data:  Last Recorded Vitals:  Vitals:    23 1942 23 0015 23 0403 23 0729   BP: 158/65 140/69 138/53 170/67   BP Location: Right arm Right arm Right arm Left arm   Patient Position: Lying Sitting Lying Sitting   Pulse: 71 62 61 60   Resp: 18 18 18 18   Temp: 36 °C (96.8 °F) 36.8 °C (98.2 °F) 37 °C (98.6 °F) 36.7 °C (98 °F)   TempSrc: Oral Oral Oral Temporal   SpO2: 96% 96% 97%    Weight:         Medical Gas Therapy: None (Room air)  Weight  Av.1 kg (169 lb 15.6 oz)  Min: 77.1 kg (169 lb 15.6 oz)  Max: 77.1 kg (169 lb 15.6 oz)      LABS:  CMP:  Results from last 7 days   Lab Units 23  0410 23  0941 23  0925   SODIUM mmol/L 138  --  137   POTASSIUM mmol/L 3.4*  --  4.0   CHLORIDE mmol/L 103  --  102   CO2 mmol/L 24  --  24   ANION GAP mmol/L 14  --  15   BUN mg/dL 23  --  31*   CREATININE mg/dL 1.35*  --  1.47*   EGFR mL/min/1.73m*2 50*  --  46*   MAGNESIUM mg/dL  --  1.80  --    ALBUMIN g/dL  --   --  4.3   ALT U/L  --   --  15   AST U/L  --   --  26   BILIRUBIN TOTAL mg/dL  --   --  0.6   LIPASE U/L  --  11  --      CBC:  Results from last 7 days   Lab Units 23  0410 23  0925   WBC AUTO x10*3/uL 6.8 5.7   HEMOGLOBIN g/dL 10.1* 10.9*   HEMATOCRIT % 31.1* 33.7*   PLATELETS AUTO x10*3/uL 211 229   MCV fL 93 93     COAG:     ABO: No results found for: \"ABO\"  HEME/ENDO:     CARDIAC:   Results from last 7 days   Lab Units 23  0410 23  0941 23  0925   TROPHS ng/L 19 6 6   BNP pg/mL  --   --  180*             Last I/O:  No intake or output data in the 24 hours ending 23 0742  Net IO Since Admission: No IO data has been entered for this period [23 0742]      Imaging " Results:  XR chest 1 view    Result Date: 12/23/2023  Interpreted By:  David Riley, STUDY: XR CHEST 1 VIEW;  12/23/2023 10:25 am   INDICATION: Signs/Symptoms:cp.   COMPARISON: 11/18/2022   ACCESSION NUMBER(S): [id number]   ORDERING CLINICIAN: CYNTHIA ECHOLS   FINDINGS: Pacemaker leads are intact and properly positioned.       CARDIOMEDIASTINAL SILHOUETTE: Cardiomediastinal silhouette is normal in size and configuration.   LUNGS: Fibrotic changes left lung base.   ABDOMEN: No remarkable upper abdominal findings.   BONES: No acute osseous changes.       1.  No evidence of acute cardiopulmonary process.       MACRO: None   Signed by: David Riley 12/23/2023 11:22 AM Dictation workstation:   [id number]      Inpatient Medications:  Scheduled medications   Medication Dose Route Frequency    amiodarone  200 mg oral Daily    amLODIPine  10 mg oral Daily    aspirin  81 mg oral Daily    carvedilol  25 mg oral BID with meals    cloNIDine  0.2 mg oral BID    DULoxetine  30 mg oral Daily    enoxaparin  40 mg subcutaneous q24h    fenofibrate  160 mg oral Daily    furosemide  20 mg oral Daily    insulin glargine  10 Units subcutaneous Nightly    insulin lispro  0-5 Units subcutaneous TID with meals    levothyroxine  88 mcg oral Daily before breakfast    [Held by provider] losartan  100 mg oral Daily    magnesium oxide  400 mg oral Daily    melatonin  3 mg oral Daily    montelukast  10 mg oral Nightly    pantoprazole  40 mg oral Daily before breakfast    Or    pantoprazole  40 mg intravenous Daily before breakfast    pioglitazone  45 mg oral Daily    polyethylene glycol  17 g oral Daily    potassium chloride CR  20 mEq oral Daily    traZODone  50 mg oral Nightly     PRN medications   Medication    acetaminophen    Or    acetaminophen    Or    acetaminophen    dextrose 10 % in water (D10W)    dextrose    glucagon    ondansetron ODT    Or    ondansetron     Continuous Medications   Medication Dose Last Rate        Outpatient Medications:  Current Outpatient Medications   Medication Instructions    amiodarone (PACERONE) 200 mg, oral, Daily    amLODIPine (NORVASC) 10 mg, oral, Daily    aspirin 81 mg, oral, Daily    carvedilol (COREG) 25 mg, oral, 2 times daily with meals    cloNIDine (CATAPRES) 0.2 mg, oral, 2 times daily    DULoxetine (CYMBALTA) 30 mg, oral, Daily, Do not crush or chew.    fenofibrate (TRICOR) 145 mg, oral, Daily    furosemide (LASIX) 20 mg, oral, Daily    insulin glargine (LANTUS U-100 INSULIN) 25 Units, subcutaneous, Nightly, Take as directed per insulin instructions.    levothyroxine (TIROSINT) 88 mcg, oral, Daily before breakfast    losartan (COZAAR) 100 mg, oral, Daily    montelukast (SINGULAIR) 10 mg, oral, Nightly    pioglitazone (ACTOS) 45 mg, oral, Daily    potassium chloride CR 20 mEq ER tablet 20 mEq, oral, Daily, Do not crush or chew.    traZODone (DESYREL) 50 mg, oral, Nightly       Physical Exam:  General:  Patient is awake, alert, and oriented.  Patient is in no acute distress.  HEENT:  Pupils equal and reactive.  Normocephalic.  Moist mucosa.    Neck:  No thyromegaly.  Normal Jugular Venous Pressure.  Cardiovascular: Distant heart sounds, no significant murmur heard  Pulmonary: Diminished  Abdomen:  Soft. Non-tender.   Non-distended.  Positive bowel sounds.  Lower Extremities:  2+ pedal pulses. No LE edema.  Neurologic:  Cranial nerves intact.  No focal deficit.   Skin: Skin warm and dry, normal skin turgor.   Psychiatric: Normal affect.     Assessment/Plan     Sonny Heart is a 88 y.o. male with past medical history significant for hypertension, paroxysmal atrial fibrillation, permanent pacemaker, heart failure preserved ejection fraction, GERD, diabetes type 2, dyslipidemia, CKD stage III presenting to emergency room complaining of chest pain.  Ruled out for MI.  Cardiology is consulted for further evaluation of chest pain.  I reviewed ECG.  Paced.  I reviewed telemetry.  Paced  rhythm no significant events  I reviewed chest x-ray radiology recommendation.      1.  Chest pain  Rule out for MI  Denies any recurrence since admission to the hospital  Vague historian  Benefits from further assistance occasional stress testing however unable to arrange this hospital stay given weekend and tomorrow being [place]. Stress test to arrange as out pt. Yazmin stress test given paced rhythm,    2. P afib on Amio but surprisingly not on AC  He benefits from AC unless contraindications, he is established with cardiology as out pt and defer to his out pt cardiologist     3. Chronic diastolic HF  Euvolemic      4. PPM follows with [facility] cardiology         Code Status:  Full Code            Catrina Khan, APRN-CNP

## 2023-12-24 NOTE — PROGRESS NOTES
Transitional Care Coordination Progress Note:  Plan per Medical/Surgical team: treatment of CP with pacer check pending  Status: observation  Payor source: medicare A/B, [employer]  Discharge disposition: [facility] in [place] with wife  Potential Barriers: trops x 3 negative  ADOD: 12/24/2023  PANFILO Nick RN, BSN Transitional Care Coordinator ED# [phone]      12/24/23 1145   Discharge Planning   Living Arrangements Spouse/significant other   Support Systems Spouse/significant other;Children   Assistance Needed pacemaker check   Type of Residence Group home   Do you have animals or pets at home? No   Care Facility Name [facility] in [place]   Home or Post Acute Services None   Patient expects to be discharged to: [facility] in [place] with wife   Does the patient need discharge transport arranged? Yes   RoundTrip coordination needed? Yes   Has discharge transport been arranged? No   Financial Resource Strain   How hard is it for you to pay for the very basics like food, housing, medical care, and heating? Not hard   Housing Stability   In the last 12 months, was there a time when you were not able to pay the mortgage or rent on time? N   In the last 12 months, how many places have you lived? 2   In the last 12 months, was there a time when you did not have a steady place to sleep or slept in a shelter (including now)? N   Transportation Needs   In the past 12 months, has lack of transportation kept you from medical appointments or from getting medications? no   In the past 12 months, has lack of transportation kept you from meetings, work, or from getting things needed for daily living? No

## 2023-12-24 NOTE — CARE PLAN
Problem: Pain  Goal: My pain/discomfort is manageable  12/24/2023 0251 by Jose Manuel Lunsford LPN  Outcome: Progressing  12/24/2023 0249 by Jose Manuel Lunsford LPN  Outcome: Progressing     Problem: Pain  Goal: My pain/discomfort is manageable  12/24/2023 0251 by Jose Manuel Lunsford LPN  Outcome: Progressing  12/24/2023 0249 by Jose Manuel Lunsford LPN  Outcome: Progressing   The patient's goals for the shift include      The clinical goals for the shift include free of falls

## 2023-12-25 NOTE — NURSING NOTE
Spoke to daughter Bridgette on the phone re: possible discharge, and reschedule for stress test next week. Let her know I will inform her of definite plan, all with patients permission.

## 2023-12-25 NOTE — NURSING NOTE
Bridgette called back ,and made aware of discharge and plan. All discharge teaching done with pt. And daughter. Await transportation home.

## 2023-12-27 LAB
ATRIAL RATE: 62 BPM
P AXIS: 36 DEGREES
P OFFSET: 140 MS
P ONSET: 94 MS
PR INTERVAL: 232 MS
Q ONSET: 210 MS
QRS COUNT: 10 BEATS
QRS DURATION: 160 MS
QT INTERVAL: 494 MS
QTC CALCULATION(BAZETT): 501 MS
QTC FREDERICIA: 499 MS
R AXIS: 262 DEGREES
T AXIS: 67 DEGREES
T OFFSET: 457 MS
VENTRICULAR RATE: 62 BPM

## 2024-10-04 ENCOUNTER — OFFICE VISIT (OUTPATIENT)
Dept: URGENT CARE | Age: 89
End: 2024-10-04
Payer: MEDICARE

## 2024-10-04 VITALS
HEART RATE: 69 BPM | DIASTOLIC BLOOD PRESSURE: 69 MMHG | BODY MASS INDEX: 26.61 KG/M2 | WEIGHT: 175 LBS | OXYGEN SATURATION: 95 % | SYSTOLIC BLOOD PRESSURE: 150 MMHG | RESPIRATION RATE: 16 BRPM | TEMPERATURE: 98.4 F

## 2024-10-04 DIAGNOSIS — R42 DIZZINESS: Primary | ICD-10-CM

## 2024-10-04 DIAGNOSIS — I10 PRIMARY HYPERTENSION: ICD-10-CM

## 2024-10-04 LAB — POC FINGERSTICK BLOOD GLUCOSE: 164 MG/DL (ref 70–100)

## 2024-10-04 NOTE — PROGRESS NOTES
Subjective   Patient ID: Sonny Heart is a 89 y.o. male. They present today with a chief complaint of High Blood Pressure, Headache, and Blurred Vision.    History of Present Illness  HPI  Pt is an 89 yr old male who presents with headache no worse then normal he gets infusions for headaches, blurred vision but has been taking antihistamines and has macular degeneration and gets injections in his eyes every 3 months. elevated BP at home for the past 4 days but comes down during the day.  He has been eating natali noodles daily.  He has a long history of DM, CHF, hear disease, afib.  He denies chest pain or sob.  Past Medical History  Allergies as of 10/04/2024 - Reviewed 10/04/2024   Allergen Reaction Noted    Aspirin Anaphylaxis 03/20/2013    Atorvastatin Unknown and Myalgia 01/28/2016    Codeine Nausea And Vomiting and Unknown 06/21/2002    Fenofibrate Nausea And Vomiting and Unknown 05/13/2010    Gemfibrozil Nausea Only and Unknown 05/13/2010    Lisinopril Unknown 02/14/2017    Niacin Nausea And Vomiting and Unknown 08/18/2015    Quinapril Nausea And Vomiting and Unknown 07/26/2013    Quinine Unknown 12/08/2022    Warfarin Hives and Unknown 02/14/2017       (Not in a hospital admission)       No past medical history on file.    No past surgical history on file.     reports that he has quit smoking. His smoking use included cigarettes. He has never used smokeless tobacco.    Review of Systems  Review of Systems  Gen: No fatigue, fever, sweats.  Head: + headache, no trauma.  Eyes: No vision loss, double vision, drainage, eye pain. + blurred vision  ENT: No hearing changes, pain, epistaxis, congestion  Cardiac: No chest pain  Pulmonary: No shortness of breath,  pleuritic pain,   Heme/lymph: No swollen glands  GI: No abdominal pain, nausea, vomiting, diarrhea  : No  dysuria, frequency, urgency, hematuria  Musculoskeletal: No limb pain, joint pain, back pain, joint swelling or stiffness.  Skin: No rashes,  pruritus, lumps, lesions.  Neuro: No Numbness, tingling, or weakness. + dizziness   Psych: No  anxiety     Review of systems is otherwise negative unless stated above or in history of present illness.                             Objective    Vitals:    10/04/24 1839   BP: 150/69   Pulse: 69   Resp: 16   Temp: 36.9 °C (98.4 °F)   SpO2: 95%   Weight: 79.4 kg (175 lb)     No LMP for male patient.    Physical Exam  General: Vital signs stable, Pt is alert, no acute distress  Eyes: Conjunctiva normal, PERRL, EOMs intact  HENMT: Normocephalic, atraumatic, external ears and nose normal, no scars or masses.  No mastoid tenderness. Trachea is midline. No meningeal signs, negative Kernig and Brudzinski, moves neck freely.  No sinus tenderness  Resp: Respiratory effort is normal, no retractions, no stridor. Lungs CTA, no wheezes or rhonchi  CV: Heart is regular rate and rhythm.   Skin: No evidence of trauma, skin is warm and dry. No rashes, lesions or ulcers.  Skel: full range of motion of upper and lower extremities.   Neuro: Normal gait, CN II-XII intact, no motor or sensory changes.  Psych: Alert and oriented ×3, judgment is appropriate, normal mood and affect   Procedures    Point of Care Test & Imaging Results from this visit  No results found for this visit on 10/04/24.   No results found.    Diagnostic study results (if any) were reviewed by SHEA Fairchild.    Assessment/Plan   Allergies, medications, history, and pertinent labs/EKGs/Imaging reviewed by SHEA Fairchild.     Medical Decision Making  History and physical is consistent with HTN due to increased salt intake no signs of CHF, CVA, MI.  He has an appt with his internist on Monday he will ask about his BP meds    Orders and Diagnoses  There are no diagnoses linked to this encounter.    Medical Admin Record      Patient disposition: Home    Electronically signed by SHEA Fairchild  6:43 PM

## 2025-04-15 ENCOUNTER — HOSPITAL ENCOUNTER (INPATIENT)
Facility: HOSPITAL | Age: OVER 89
LOS: 1 days | Discharge: HOME | End: 2025-04-17
Attending: EMERGENCY MEDICINE | Admitting: STUDENT IN AN ORGANIZED HEALTH CARE EDUCATION/TRAINING PROGRAM
Payer: MEDICARE

## 2025-04-15 ENCOUNTER — APPOINTMENT (OUTPATIENT)
Dept: RADIOLOGY | Facility: HOSPITAL | Age: OVER 89
End: 2025-04-15
Payer: MEDICARE

## 2025-04-15 ENCOUNTER — APPOINTMENT (OUTPATIENT)
Dept: CARDIOLOGY | Facility: HOSPITAL | Age: OVER 89
End: 2025-04-15
Payer: MEDICARE

## 2025-04-15 ENCOUNTER — OFFICE VISIT (OUTPATIENT)
Dept: URGENT CARE | Age: OVER 89
End: 2025-04-15
Payer: MEDICARE

## 2025-04-15 VITALS
DIASTOLIC BLOOD PRESSURE: 64 MMHG | HEART RATE: 95 BPM | SYSTOLIC BLOOD PRESSURE: 96 MMHG | OXYGEN SATURATION: 96 % | RESPIRATION RATE: 22 BRPM | TEMPERATURE: 98.8 F

## 2025-04-15 DIAGNOSIS — J43.9 ACUTE EXACERBATION OF EMPHYSEMA: ICD-10-CM

## 2025-04-15 DIAGNOSIS — N17.9 AKI (ACUTE KIDNEY INJURY): Primary | ICD-10-CM

## 2025-04-15 DIAGNOSIS — R07.9 CHEST PAIN, UNSPECIFIED TYPE: ICD-10-CM

## 2025-04-15 DIAGNOSIS — J44.1 ACUTE EXACERBATION OF EMPHYSEMA: ICD-10-CM

## 2025-04-15 DIAGNOSIS — R06.02 SHORTNESS OF BREATH ON EXERTION: ICD-10-CM

## 2025-04-15 DIAGNOSIS — J40 BRONCHITIS: ICD-10-CM

## 2025-04-15 DIAGNOSIS — T17.998A MUCUS PLUG IN RESPIRATORY TRACT: ICD-10-CM

## 2025-04-15 LAB
ALBUMIN SERPL BCP-MCNC: 4.5 G/DL (ref 3.4–5)
ALP SERPL-CCNC: 40 U/L (ref 33–136)
ALT SERPL W P-5'-P-CCNC: 16 U/L (ref 10–52)
ANION GAP SERPL CALC-SCNC: 12 MMOL/L (ref 10–20)
AST SERPL W P-5'-P-CCNC: 39 U/L (ref 9–39)
BASOPHILS # BLD AUTO: 0.06 X10*3/UL (ref 0–0.1)
BASOPHILS NFR BLD AUTO: 0.7 %
BILIRUB SERPL-MCNC: 0.6 MG/DL (ref 0–1.2)
BNP SERPL-MCNC: 127 PG/ML (ref 0–99)
BUN SERPL-MCNC: 36 MG/DL (ref 6–23)
CALCIUM SERPL-MCNC: 9.5 MG/DL (ref 8.6–10.3)
CARDIAC TROPONIN I PNL SERPL HS: 13 NG/L (ref 0–20)
CARDIAC TROPONIN I PNL SERPL HS: 14 NG/L (ref 0–20)
CHLORIDE SERPL-SCNC: 103 MMOL/L (ref 98–107)
CO2 SERPL-SCNC: 26 MMOL/L (ref 21–32)
CREAT SERPL-MCNC: 1.73 MG/DL (ref 0.5–1.3)
D DIMER PPP FEU-MCNC: 648 NG/ML FEU
EGFRCR SERPLBLD CKD-EPI 2021: 37 ML/MIN/1.73M*2
EOSINOPHIL # BLD AUTO: 0.1 X10*3/UL (ref 0–0.4)
EOSINOPHIL NFR BLD AUTO: 1.2 %
ERYTHROCYTE [DISTWIDTH] IN BLOOD BY AUTOMATED COUNT: 14.8 % (ref 11.5–14.5)
FLUAV RNA RESP QL NAA+PROBE: NOT DETECTED
FLUBV RNA RESP QL NAA+PROBE: NOT DETECTED
GLUCOSE BLD MANUAL STRIP-MCNC: 188 MG/DL (ref 74–99)
GLUCOSE SERPL-MCNC: 141 MG/DL (ref 74–99)
HCT VFR BLD AUTO: 35.3 % (ref 41–52)
HGB BLD-MCNC: 11 G/DL (ref 13.5–17.5)
IMM GRANULOCYTES # BLD AUTO: 0.1 X10*3/UL (ref 0–0.5)
IMM GRANULOCYTES NFR BLD AUTO: 1.2 % (ref 0–0.9)
INR PPP: 1.2 (ref 0.9–1.1)
LYMPHOCYTES # BLD AUTO: 1.79 X10*3/UL (ref 0.8–3)
LYMPHOCYTES NFR BLD AUTO: 21.9 %
MAGNESIUM SERPL-MCNC: 2.18 MG/DL (ref 1.6–2.4)
MCH RBC QN AUTO: 30.1 PG (ref 26–34)
MCHC RBC AUTO-ENTMCNC: 31.2 G/DL (ref 32–36)
MCV RBC AUTO: 97 FL (ref 80–100)
MONOCYTES # BLD AUTO: 0.88 X10*3/UL (ref 0.05–0.8)
MONOCYTES NFR BLD AUTO: 10.8 %
NEUTROPHILS # BLD AUTO: 5.25 X10*3/UL (ref 1.6–5.5)
NEUTROPHILS NFR BLD AUTO: 64.2 %
NRBC BLD-RTO: 0 /100 WBCS (ref 0–0)
PLATELET # BLD AUTO: 265 X10*3/UL (ref 150–450)
POTASSIUM SERPL-SCNC: 5.1 MMOL/L (ref 3.5–5.3)
PROT SERPL-MCNC: 7.5 G/DL (ref 6.4–8.2)
PROTHROMBIN TIME: 12.8 SECONDS (ref 9.8–12.4)
RBC # BLD AUTO: 3.65 X10*6/UL (ref 4.5–5.9)
RSV RNA RESP QL NAA+PROBE: NOT DETECTED
SARS-COV-2 RNA RESP QL NAA+PROBE: NOT DETECTED
SODIUM SERPL-SCNC: 136 MMOL/L (ref 136–145)
WBC # BLD AUTO: 8.2 X10*3/UL (ref 4.4–11.3)

## 2025-04-15 PROCEDURE — G0378 HOSPITAL OBSERVATION PER HR: HCPCS

## 2025-04-15 PROCEDURE — 93005 ELECTROCARDIOGRAM TRACING: CPT

## 2025-04-15 PROCEDURE — 36415 COLL VENOUS BLD VENIPUNCTURE: CPT | Performed by: EMERGENCY MEDICINE

## 2025-04-15 PROCEDURE — 2500000002 HC RX 250 W HCPCS SELF ADMINISTERED DRUGS (ALT 637 FOR MEDICARE OP, ALT 636 FOR OP/ED)

## 2025-04-15 PROCEDURE — 85379 FIBRIN DEGRADATION QUANT: CPT

## 2025-04-15 PROCEDURE — 87637 SARSCOV2&INF A&B&RSV AMP PRB: CPT | Performed by: EMERGENCY MEDICINE

## 2025-04-15 PROCEDURE — 71045 X-RAY EXAM CHEST 1 VIEW: CPT | Performed by: RADIOLOGY

## 2025-04-15 PROCEDURE — 85025 COMPLETE CBC W/AUTO DIFF WBC: CPT | Performed by: EMERGENCY MEDICINE

## 2025-04-15 PROCEDURE — 93000 ELECTROCARDIOGRAM COMPLETE: CPT | Performed by: NURSE PRACTITIONER

## 2025-04-15 PROCEDURE — 71275 CT ANGIOGRAPHY CHEST: CPT | Performed by: STUDENT IN AN ORGANIZED HEALTH CARE EDUCATION/TRAINING PROGRAM

## 2025-04-15 PROCEDURE — 83880 ASSAY OF NATRIURETIC PEPTIDE: CPT | Performed by: EMERGENCY MEDICINE

## 2025-04-15 PROCEDURE — 99285 EMERGENCY DEPT VISIT HI MDM: CPT | Mod: 25 | Performed by: EMERGENCY MEDICINE

## 2025-04-15 PROCEDURE — 99221 1ST HOSP IP/OBS SF/LOW 40: CPT

## 2025-04-15 PROCEDURE — 84484 ASSAY OF TROPONIN QUANT: CPT | Performed by: EMERGENCY MEDICINE

## 2025-04-15 PROCEDURE — 71275 CT ANGIOGRAPHY CHEST: CPT

## 2025-04-15 PROCEDURE — 82947 ASSAY GLUCOSE BLOOD QUANT: CPT

## 2025-04-15 PROCEDURE — 80053 COMPREHEN METABOLIC PANEL: CPT | Performed by: EMERGENCY MEDICINE

## 2025-04-15 PROCEDURE — 2500000004 HC RX 250 GENERAL PHARMACY W/ HCPCS (ALT 636 FOR OP/ED)

## 2025-04-15 PROCEDURE — 99215 OFFICE O/P EST HI 40 MIN: CPT | Performed by: NURSE PRACTITIONER

## 2025-04-15 PROCEDURE — 85610 PROTHROMBIN TIME: CPT | Performed by: EMERGENCY MEDICINE

## 2025-04-15 PROCEDURE — 2500000001 HC RX 250 WO HCPCS SELF ADMINISTERED DRUGS (ALT 637 FOR MEDICARE OP)

## 2025-04-15 PROCEDURE — 83735 ASSAY OF MAGNESIUM: CPT | Performed by: EMERGENCY MEDICINE

## 2025-04-15 PROCEDURE — 2500000004 HC RX 250 GENERAL PHARMACY W/ HCPCS (ALT 636 FOR OP/ED): Performed by: EMERGENCY MEDICINE

## 2025-04-15 PROCEDURE — 2500000002 HC RX 250 W HCPCS SELF ADMINISTERED DRUGS (ALT 637 FOR MEDICARE OP, ALT 636 FOR OP/ED): Performed by: EMERGENCY MEDICINE

## 2025-04-15 PROCEDURE — 2550000001 HC RX 255 CONTRASTS: Performed by: EMERGENCY MEDICINE

## 2025-04-15 PROCEDURE — 96361 HYDRATE IV INFUSION ADD-ON: CPT

## 2025-04-15 PROCEDURE — 71045 X-RAY EXAM CHEST 1 VIEW: CPT

## 2025-04-15 PROCEDURE — 96365 THER/PROPH/DIAG IV INF INIT: CPT | Mod: 59

## 2025-04-15 PROCEDURE — 96372 THER/PROPH/DIAG INJ SC/IM: CPT

## 2025-04-15 RX ORDER — ENOXAPARIN SODIUM 100 MG/ML
30 INJECTION SUBCUTANEOUS EVERY 24 HOURS
Status: DISCONTINUED | OUTPATIENT
Start: 2025-04-15 | End: 2025-04-15

## 2025-04-15 RX ORDER — CLONIDINE HYDROCHLORIDE 0.2 MG/1
0.2 TABLET ORAL 2 TIMES DAILY
Status: DISCONTINUED | OUTPATIENT
Start: 2025-04-15 | End: 2025-04-17 | Stop reason: HOSPADM

## 2025-04-15 RX ORDER — CARVEDILOL 25 MG/1
25 TABLET ORAL
Status: DISCONTINUED | OUTPATIENT
Start: 2025-04-16 | End: 2025-04-16

## 2025-04-15 RX ORDER — ASPIRIN 81 MG/1
81 TABLET ORAL DAILY
Status: DISCONTINUED | OUTPATIENT
Start: 2025-04-16 | End: 2025-04-17 | Stop reason: HOSPADM

## 2025-04-15 RX ORDER — ACETAMINOPHEN 160 MG/5ML
650 SOLUTION ORAL EVERY 4 HOURS PRN
Status: DISCONTINUED | OUTPATIENT
Start: 2025-04-15 | End: 2025-04-17 | Stop reason: HOSPADM

## 2025-04-15 RX ORDER — GUAIFENESIN 600 MG/1
600 TABLET, EXTENDED RELEASE ORAL 2 TIMES DAILY PRN
Status: DISCONTINUED | OUTPATIENT
Start: 2025-04-15 | End: 2025-04-16

## 2025-04-15 RX ORDER — PANTOPRAZOLE SODIUM 40 MG/1
40 TABLET, DELAYED RELEASE ORAL
Status: DISCONTINUED | OUTPATIENT
Start: 2025-04-16 | End: 2025-04-17 | Stop reason: HOSPADM

## 2025-04-15 RX ORDER — ONDANSETRON 4 MG/1
4 TABLET, FILM COATED ORAL EVERY 8 HOURS PRN
Status: DISCONTINUED | OUTPATIENT
Start: 2025-04-15 | End: 2025-04-17 | Stop reason: HOSPADM

## 2025-04-15 RX ORDER — IPRATROPIUM BROMIDE AND ALBUTEROL SULFATE 2.5; .5 MG/3ML; MG/3ML
3 SOLUTION RESPIRATORY (INHALATION)
Status: DISCONTINUED | OUTPATIENT
Start: 2025-04-15 | End: 2025-04-15

## 2025-04-15 RX ORDER — AMIODARONE HYDROCHLORIDE 200 MG/1
200 TABLET ORAL DAILY
Status: DISCONTINUED | OUTPATIENT
Start: 2025-04-16 | End: 2025-04-17 | Stop reason: HOSPADM

## 2025-04-15 RX ORDER — ACETAMINOPHEN 325 MG/1
650 TABLET ORAL EVERY 4 HOURS PRN
Status: DISCONTINUED | OUTPATIENT
Start: 2025-04-15 | End: 2025-04-17 | Stop reason: HOSPADM

## 2025-04-15 RX ORDER — LATANOPROST 50 UG/ML
1 SOLUTION/ DROPS OPHTHALMIC NIGHTLY
Status: DISCONTINUED | OUTPATIENT
Start: 2025-04-15 | End: 2025-04-17 | Stop reason: HOSPADM

## 2025-04-15 RX ORDER — IPRATROPIUM BROMIDE AND ALBUTEROL SULFATE 2.5; .5 MG/3ML; MG/3ML
3 SOLUTION RESPIRATORY (INHALATION) ONCE
Status: COMPLETED | OUTPATIENT
Start: 2025-04-15 | End: 2025-04-15

## 2025-04-15 RX ORDER — DEXTROSE 50 % IN WATER (D50W) INTRAVENOUS SYRINGE
12.5
Status: DISCONTINUED | OUTPATIENT
Start: 2025-04-15 | End: 2025-04-17 | Stop reason: HOSPADM

## 2025-04-15 RX ORDER — ONDANSETRON HYDROCHLORIDE 2 MG/ML
4 INJECTION, SOLUTION INTRAVENOUS EVERY 8 HOURS PRN
Status: DISCONTINUED | OUTPATIENT
Start: 2025-04-15 | End: 2025-04-17 | Stop reason: HOSPADM

## 2025-04-15 RX ORDER — AMLODIPINE BESYLATE 10 MG/1
10 TABLET ORAL NIGHTLY
Status: DISCONTINUED | OUTPATIENT
Start: 2025-04-16 | End: 2025-04-17 | Stop reason: HOSPADM

## 2025-04-15 RX ORDER — INSULIN LISPRO 100 [IU]/ML
0-5 INJECTION, SOLUTION INTRAVENOUS; SUBCUTANEOUS
Status: DISCONTINUED | OUTPATIENT
Start: 2025-04-15 | End: 2025-04-17 | Stop reason: HOSPADM

## 2025-04-15 RX ORDER — PIOGLITAZONE 15 MG/1
45 TABLET ORAL DAILY
Status: DISCONTINUED | OUTPATIENT
Start: 2025-04-16 | End: 2025-04-17 | Stop reason: HOSPADM

## 2025-04-15 RX ORDER — LEVOTHYROXINE SODIUM 88 UG/1
88 TABLET ORAL DAILY
Status: DISCONTINUED | OUTPATIENT
Start: 2025-04-16 | End: 2025-04-17 | Stop reason: HOSPADM

## 2025-04-15 RX ORDER — IPRATROPIUM BROMIDE AND ALBUTEROL SULFATE 2.5; .5 MG/3ML; MG/3ML
3 SOLUTION RESPIRATORY (INHALATION) EVERY 2 HOUR PRN
Status: DISCONTINUED | OUTPATIENT
Start: 2025-04-15 | End: 2025-04-17 | Stop reason: HOSPADM

## 2025-04-15 RX ORDER — LOSARTAN POTASSIUM 50 MG/1
100 TABLET ORAL DAILY
Status: DISCONTINUED | OUTPATIENT
Start: 2025-04-16 | End: 2025-04-16

## 2025-04-15 RX ORDER — DEXTROSE 50 % IN WATER (D50W) INTRAVENOUS SYRINGE
25
Status: DISCONTINUED | OUTPATIENT
Start: 2025-04-15 | End: 2025-04-17 | Stop reason: HOSPADM

## 2025-04-15 RX ORDER — TRAZODONE HYDROCHLORIDE 50 MG/1
50 TABLET ORAL NIGHTLY
Status: DISCONTINUED | OUTPATIENT
Start: 2025-04-15 | End: 2025-04-16

## 2025-04-15 RX ORDER — POLYETHYLENE GLYCOL 3350 17 G/17G
17 POWDER, FOR SOLUTION ORAL DAILY
Status: DISCONTINUED | OUTPATIENT
Start: 2025-04-16 | End: 2025-04-17 | Stop reason: HOSPADM

## 2025-04-15 RX ORDER — HEPARIN SODIUM 5000 [USP'U]/ML
5000 INJECTION, SOLUTION INTRAVENOUS; SUBCUTANEOUS EVERY 8 HOURS
Status: DISCONTINUED | OUTPATIENT
Start: 2025-04-15 | End: 2025-04-17 | Stop reason: HOSPADM

## 2025-04-15 RX ORDER — MONTELUKAST SODIUM 10 MG/1
10 TABLET ORAL NIGHTLY
Status: DISCONTINUED | OUTPATIENT
Start: 2025-04-15 | End: 2025-04-17 | Stop reason: HOSPADM

## 2025-04-15 RX ORDER — ACETAMINOPHEN 650 MG/1
650 SUPPOSITORY RECTAL EVERY 4 HOURS PRN
Status: DISCONTINUED | OUTPATIENT
Start: 2025-04-15 | End: 2025-04-17 | Stop reason: HOSPADM

## 2025-04-15 RX ADMIN — TRAZODONE HYDROCHLORIDE 50 MG: 50 TABLET ORAL at 22:52

## 2025-04-15 RX ADMIN — MONTELUKAST 10 MG: 10 TABLET, FILM COATED ORAL at 22:52

## 2025-04-15 RX ADMIN — HEPARIN SODIUM 5000 UNITS: 5000 INJECTION, SOLUTION INTRAVENOUS; SUBCUTANEOUS at 22:22

## 2025-04-15 RX ADMIN — LATANOPROST 1 DROP: 50 SOLUTION/ DROPS OPHTHALMIC at 23:16

## 2025-04-15 RX ADMIN — DOXYCYCLINE 100 MG: 100 INJECTION, POWDER, LYOPHILIZED, FOR SOLUTION INTRAVENOUS at 19:57

## 2025-04-15 RX ADMIN — INSULIN LISPRO 1 UNITS: 100 INJECTION, SOLUTION INTRAVENOUS; SUBCUTANEOUS at 22:22

## 2025-04-15 RX ADMIN — IPRATROPIUM BROMIDE AND ALBUTEROL SULFATE 3 ML: 2.5; .5 SOLUTION RESPIRATORY (INHALATION) at 20:04

## 2025-04-15 RX ADMIN — CLONIDINE HYDROCHLORIDE 0.2 MG: 0.2 TABLET ORAL at 22:52

## 2025-04-15 RX ADMIN — SODIUM CHLORIDE 500 ML: 0.9 INJECTION, SOLUTION INTRAVENOUS at 19:59

## 2025-04-15 RX ADMIN — GUAIFENESIN 600 MG: 600 TABLET ORAL at 22:52

## 2025-04-15 RX ADMIN — IOHEXOL 75 ML: 350 INJECTION, SOLUTION INTRAVENOUS at 17:44

## 2025-04-15 SDOH — ECONOMIC STABILITY: INCOME INSECURITY: IN THE PAST 12 MONTHS HAS THE ELECTRIC, GAS, OIL, OR WATER COMPANY THREATENED TO SHUT OFF SERVICES IN YOUR HOME?: NO

## 2025-04-15 SDOH — SOCIAL STABILITY: SOCIAL INSECURITY
WITHIN THE LAST YEAR, HAVE YOU BEEN KICKED, HIT, SLAPPED, OR OTHERWISE PHYSICALLY HURT BY YOUR PARTNER OR EX-PARTNER?: NO

## 2025-04-15 SDOH — SOCIAL STABILITY: SOCIAL INSECURITY: WITHIN THE LAST YEAR, HAVE YOU BEEN AFRAID OF YOUR PARTNER OR EX-PARTNER?: NO

## 2025-04-15 SDOH — ECONOMIC STABILITY: FOOD INSECURITY: WITHIN THE PAST 12 MONTHS, THE FOOD YOU BOUGHT JUST DIDN'T LAST AND YOU DIDN'T HAVE MONEY TO GET MORE.: NEVER TRUE

## 2025-04-15 SDOH — ECONOMIC STABILITY: FOOD INSECURITY: WITHIN THE PAST 12 MONTHS, YOU WORRIED THAT YOUR FOOD WOULD RUN OUT BEFORE YOU GOT THE MONEY TO BUY MORE.: NEVER TRUE

## 2025-04-15 SDOH — SOCIAL STABILITY: SOCIAL INSECURITY
WITHIN THE LAST YEAR, HAVE YOU BEEN RAPED OR FORCED TO HAVE ANY KIND OF SEXUAL ACTIVITY BY YOUR PARTNER OR EX-PARTNER?: NO

## 2025-04-15 SDOH — SOCIAL STABILITY: SOCIAL INSECURITY: WITHIN THE LAST YEAR, HAVE YOU BEEN HUMILIATED OR EMOTIONALLY ABUSED IN OTHER WAYS BY YOUR PARTNER OR EX-PARTNER?: NO

## 2025-04-15 SDOH — SOCIAL STABILITY: SOCIAL INSECURITY: HAVE YOU HAD THOUGHTS OF HARMING ANYONE ELSE?: NO

## 2025-04-15 SDOH — SOCIAL STABILITY: SOCIAL INSECURITY: WERE YOU ABLE TO COMPLETE ALL THE BEHAVIORAL HEALTH SCREENINGS?: YES

## 2025-04-15 ASSESSMENT — ENCOUNTER SYMPTOMS
ACTIVITY CHANGE: 1
COUGH: 1
PALPITATIONS: 0
DIARRHEA: 0
ABDOMINAL PAIN: 0
MYALGIAS: 0
RHINORRHEA: 0
VOMITING: 0
CHILLS: 0
WEAKNESS: 0
FEVER: 0
FATIGUE: 1
SHORTNESS OF BREATH: 1
COUGH: 1
NAUSEA: 0
SINUS PRESSURE: 0
HEADACHES: 0
DIZZINESS: 0
SINUS PAIN: 0
SHORTNESS OF BREATH: 1
SORE THROAT: 0

## 2025-04-15 ASSESSMENT — COGNITIVE AND FUNCTIONAL STATUS - GENERAL
HELP NEEDED FOR BATHING: A LITTLE
TOILETING: A LITTLE
CLIMB 3 TO 5 STEPS WITH RAILING: A LITTLE
PATIENT BASELINE BEDBOUND: NO
MOBILITY SCORE: 22
WALKING IN HOSPITAL ROOM: A LITTLE
DRESSING REGULAR LOWER BODY CLOTHING: A LITTLE
DAILY ACTIVITIY SCORE: 21

## 2025-04-15 ASSESSMENT — ACTIVITIES OF DAILY LIVING (ADL)
PATIENT'S MEMORY ADEQUATE TO SAFELY COMPLETE DAILY ACTIVITIES?: YES
ADEQUATE_TO_COMPLETE_ADL: NO
ASSISTIVE_DEVICE: WALKER
LACK_OF_TRANSPORTATION: NO
HEARING - LEFT EAR: HEARING AID
TOILETING: NEEDS ASSISTANCE
BATHING: NEEDS ASSISTANCE
DRESSING YOURSELF: NEEDS ASSISTANCE
FEEDING YOURSELF: INDEPENDENT
JUDGMENT_ADEQUATE_SAFELY_COMPLETE_DAILY_ACTIVITIES: YES
HEARING - RIGHT EAR: HEARING AID
GROOMING: NEEDS ASSISTANCE
WALKS IN HOME: NEEDS ASSISTANCE

## 2025-04-15 ASSESSMENT — LIFESTYLE VARIABLES
HOW OFTEN DO YOU HAVE 6 OR MORE DRINKS ON ONE OCCASION: NEVER
HOW MANY STANDARD DRINKS CONTAINING ALCOHOL DO YOU HAVE ON A TYPICAL DAY: PATIENT DOES NOT DRINK
AUDIT-C TOTAL SCORE: 0
AUDIT-C TOTAL SCORE: 0
HOW MANY STANDARD DRINKS CONTAINING ALCOHOL DO YOU HAVE ON A TYPICAL DAY: 1 OR 2
HOW OFTEN DO YOU HAVE A DRINK CONTAINING ALCOHOL: NEVER
SKIP TO QUESTIONS 9-10: 1
HOW OFTEN DO YOU HAVE A DRINK CONTAINING ALCOHOL: MONTHLY OR LESS

## 2025-04-15 ASSESSMENT — PAIN SCALES - GENERAL
PAINLEVEL_OUTOF10: 1
PAINLEVEL_OUTOF10: 0 - NO PAIN

## 2025-04-15 ASSESSMENT — PAIN - FUNCTIONAL ASSESSMENT: PAIN_FUNCTIONAL_ASSESSMENT: 0-10

## 2025-04-15 ASSESSMENT — COLUMBIA-SUICIDE SEVERITY RATING SCALE - C-SSRS
6. HAVE YOU EVER DONE ANYTHING, STARTED TO DO ANYTHING, OR PREPARED TO DO ANYTHING TO END YOUR LIFE?: NO
2. HAVE YOU ACTUALLY HAD ANY THOUGHTS OF KILLING YOURSELF?: NO
1. IN THE PAST MONTH, HAVE YOU WISHED YOU WERE DEAD OR WISHED YOU COULD GO TO SLEEP AND NOT WAKE UP?: NO

## 2025-04-15 NOTE — ED PROVIDER NOTES
History/Exam limitations: none.   Additional history was obtained from patient, relative(s), and past medical records.          HPI:    Sonny Heart is a 89 y.o. male PMH paroxysmal A-fib, pacemaker placement, CKD, diastolic dysfunction present for evaluation of shortness breath.  Patient has been having shortness breath over the last 2 to 4 days.  Also given intermittent chest discomfort that feels like a palpitation that is not clearly triggered by exertion or position.  States he is no chest pain currently.  He states that he has had a cough it has been nonproductive.  He states he went to an urgent care and told that his lungs sounded abnormal and was sent here for further evaluation.  Has had some fatigue.  No fever.  No onset of abdominal pain nausea vomiting or diarrhea.  States he has some mild swelling in his lower extremities recently had lower extremity ultrasound that was negative he states.  Reports that he takes Lasix as needed and has been below his typical weight however states he is also losing weight due to not eating as much at his current facility as he does not like the food.          Physical Exam:  ED Triage Vitals [04/15/25 1521]   Temperature Heart Rate Respirations BP   36.3 °C (97.4 °F) 82 20 164/67      Pulse Ox Temp Source Heart Rate Source Patient Position   97 % Oral Monitor --      BP Location FiO2 (%)     -- --        GEN:      Alert, NAD  Eyes:       PERRL, EOMI  HENT:      NC/AT, OP clear, airway patent, MM, no JVD  CV:      RRR, no MRG, no LE pitting edema, 2+ radial and pedal pulses  PULM:     Diffuse right mid and lower scant rhonchi and scant wheeze, easy WOB, symmetric chest rise  ABD:      Soft, NT, ND, no masses, BS +  :       No CVA TTP  NEURO:   A&Ox3, no focal deficits    MSK:      FROM, no joint deformities or swelling, no e/o trauma  SKIN:       Warm and dry  PSYCH:    Appropriate mood and affect         MDM/ED Course:   Sonny Heart is a 89 y.o. male PMH  paroxysmal A-fib, pacemaker placement, CKD, diastolic dysfunction present for evaluation of shortness breath.  Vitals and exam as documented above.  Diffuse right mid and lower scant rhonchi and scant wheeze.  Differential includes pneumonia, bronchitis, pneumothorax, ACS, PE, symptomatic anemia, viral syndrome.  Chest x-ray with no acute findings per radiology read and on my evaluation.  CBC with no leukocytosis, hemoglobin 11.0.  BNP mildly elevated at 127, appears overall euvolemic lower suspicion for pulmonary edema/CHF exacerbation.  Flu COVID RSV negative.  Chemistry with a creatinine of 1.73 from last 1.35 concerning for JOYA.  Potassium hemolyzed at 5.1.  Troponin negative x 2 less likely ACS.  Patient was given a DuoNeb, heparin cc IV fluids.  CT displays dense discrete filling defect of the bronchus intermedius extending the right middle and lower lobe, high-grade narrowing present, concern for aspiration or dense mucous plugging, underlying endobronchial lesions on the differential as well.  Plan for hospitalization given JOYA and covered with doxycycline given concern for bronchitis/mucous plugging.  Patient and patient's daughter at bedside updated and all questions answered.  Patient care signed out to admitting provider for continued management in stable condition.    EKG reviewed by me: 3:24 PM atrial sensed ventricular paced rhythm, rate 77, history AV block with parable 202 ms,  ms, appropriate discordance, no STEMI, QTc 525 ms, similar to prior EKG from December 2023.     Diagnoses as of 04/18/25 1057   JOYA (acute kidney injury)   Bronchitis   Mucus plug in respiratory tract         Chronic medical conditions affecting care listed in MDM. I independently reviewed imaging studies and agreed with radiology reads. I reviewed recent and relevant outside records including PCP notes, Prior discharge summaries, and prior radiology reports.    Procedure  Procedures    Diagnosis:   1.  Bronchitis  2.   Mucous plugging  3.  JOYA    Dispo: Hospitalized in stable condition      Disclaimer: Portions of this note were dictated by speech recognition. An attempt at proof reading was made to minimize errors. Minor errors in transcription may be present.  Please call if questions.     Mic Blackmon MD  04/18/25 4990

## 2025-04-15 NOTE — PROGRESS NOTES
Subjective   Patient ID: Sonny Heart is a 89 y.o. male. They present today with a chief complaint of Chest Pain.    History of Present Illness  89-year-old male with a medical history of, but not limited to, hypertension, hyperlipidemia, CHF, and diabetes presents to the clinic today brought by his  from his apartment complex for evaluation with 3-day complaint of chest pain, shortness of breath, fatigue.  Patient states he does not take his blood sugar as he does not take his DM II medications.  Has been having a hard time getting around due to shortness of breath.  Patient does have pacemaker.  He is unaware of any fevers.  He does monitor his weight.  Diminished appetite and energy levels.      Chest Pain  Associated symptoms: cough, fatigue and shortness of breath    Associated symptoms: no abdominal pain, no dizziness, no fever, no headache, no nausea, no palpitations, no vomiting and no weakness        Past Medical History  Allergies as of 04/15/2025 - Reviewed 10/04/2024   Allergen Reaction Noted    Aspirin Anaphylaxis 03/20/2013    Atorvastatin Unknown and Myalgia 01/28/2016    Codeine Nausea And Vomiting and Unknown 06/21/2002    Fenofibrate Nausea And Vomiting and Unknown 05/13/2010    Gemfibrozil Nausea Only and Unknown 05/13/2010    Lisinopril Unknown 02/14/2017    Niacin Nausea And Vomiting and Unknown 08/18/2015    Quinapril Nausea And Vomiting and Unknown 07/26/2013    Quinine Unknown 12/08/2022    Warfarin Hives and Unknown 02/14/2017       (Not in a hospital admission)       No past medical history on file.    No past surgical history on file.     reports that he has quit smoking. His smoking use included cigarettes. He has never used smokeless tobacco.    Review of Systems  Review of Systems   Constitutional:  Positive for activity change and fatigue. Negative for chills and fever.   HENT:  Negative for congestion, ear pain, rhinorrhea, sinus pressure, sinus pain and sore throat.     Eyes:  Negative for visual disturbance.   Respiratory:  Positive for cough and shortness of breath.    Cardiovascular:  Positive for chest pain. Negative for palpitations and leg swelling.   Gastrointestinal:  Negative for abdominal pain, diarrhea, nausea and vomiting.   Musculoskeletal:  Negative for myalgias.   Skin:  Negative for rash.   Neurological:  Negative for dizziness, weakness and headaches.   All other systems reviewed and are negative.                                 Objective    Vitals:    04/15/25 1415 04/15/25 1425   BP: 96/64    Pulse: 95    Resp: 26 22   Temp: 37.1 °C (98.8 °F)    SpO2: 91% 96%     No LMP for male patient.    Physical Exam  Vitals reviewed.   Constitutional:       General: He is not in acute distress.     Appearance: Normal appearance. He is ill-appearing. He is not toxic-appearing.   HENT:      Head: Normocephalic and atraumatic.      Nose: Nose normal.      Mouth/Throat:      Mouth: Mucous membranes are dry.      Pharynx: No oropharyngeal exudate or posterior oropharyngeal erythema.   Eyes:      Extraocular Movements: Extraocular movements intact.      Conjunctiva/sclera: Conjunctivae normal.      Pupils: Pupils are equal, round, and reactive to light.   Cardiovascular:      Rate and Rhythm: Normal rate and regular rhythm.      Pulses: Normal pulses.      Heart sounds: Normal heart sounds.   Pulmonary:      Effort: Pulmonary effort is normal.      Breath sounds: Rhonchi present.   Musculoskeletal:         General: Normal range of motion.      Cervical back: Normal range of motion and neck supple.      Right lower leg: No edema.      Left lower leg: No edema.   Skin:     General: Skin is warm and dry.      Capillary Refill: Capillary refill takes less than 2 seconds.   Neurological:      General: No focal deficit present.      Mental Status: He is alert and oriented to person, place, and time.      Cranial Nerves: No cranial nerve deficit.   Psychiatric:         Mood and Affect:  "Mood normal.         Behavior: Behavior normal.         Procedures    Point of Care Test & Imaging Results from this visit  No results found for this visit on 04/15/25.   Imaging  No results found.    Cardiology, Vascular, and Other Imaging  No other imaging results found for the past 2 days      Diagnostic study results (if any) were reviewed by Kristin L Schoenlein, APRN-CNP.    Assessment/Plan   Allergies, medications, history, and pertinent labs/EKGs/Imaging reviewed by Kristin L Schoenlein, APRN-CNP.     Medical Decision Making  Patient tachypneic and using accessory muscles for breathing with oxygen level of 91% room air.  Patient placed on 2 L via oxygen mask.  SpO2 improved, however, patient continued to feel short of breath and was \"pursed lipped \"breathing.  EMS was called.  EKG was performed, patient is paced and undetermined underlying rhythm.    Differential diagnosis include, however not limited to: CHF exacerbation, hypercapnia, pneumonia, respiratory failure, sepsis    Discussed with patient need to be seen in the emergency room for further evaluation/work-up including, however not limited to, advanced imaging and lab work.  Patient is in agreement with plan and verbalizes understanding.  EMS arrived on scene and patient left the clinic in stable condition.    Orders and Diagnoses  Diagnoses and all orders for this visit:  Chest pain, unspecified type  -     ECG 12 Lead  -     ECG 12 lead (Clinic Performed)      Medical Admin Record      Patient disposition: ED    Electronically signed by Kristin L Schoenlein, APRN-CNP  3:14 PM      "

## 2025-04-15 NOTE — H&P
"History Of Present Illness  Sonny Heart is a 89 y.o. male with a past medical history of HTN, paroxysmal A-fib s/p PPM, HFpEF, GERD, DMII, HLD, CKD3, former smoker who presents to [facility] ED for complaints of shortness breath. Daughter at beside assisting in HPI. Patient has been having shortness breath over the last 2 to 4 days. Reports shortness of breath occurs at rest and occasionally on exertion. Additionally reports intermittent chest discomfort, describes it as \"pulsating\", and an intensity of a 5 out of 10. States he is currently chest pain free.  He states that he has had a cough and it has been nonproductive. He reports that he has been dealing with a post nasal drip for over a month now, visited with ENT who suggest nasal spray and mucinex at night. He states this has helped, but hasn't cleared completely. Today he woke up, still not feeling well, continued to have shortness of breath, call daughter he suggested he go to Urgent Care. At , they listened to his lungs and directed him to [facility]. Denies fever, abdominal pain, N/V/D. Reports some swelling to lower extremities, more in the right > L, feels its related to his arthritis. Reports that he takes Lasix as needed and has been below his typical weight however states he is also losing weight due to not eating as much at his current facility as he does not like the food.     ED work up notable for:  EKG: Atrial- sensed ventricular- paced rhythm, 77 bpm  Vital signs: 36.3 (97.4), HR 82, RR 26, /67, 97% on room air  Labs: glucose 141, BUN 36/ Crt 1.71, EGFR 37, , Troponin 13-->14, H&H 11.0/35.3. viral panel negative  CXR: Negative   CTA Chest: Relatively dense and discrete filling defect in the bronchus intermedius that extends into the right middle and right lower lobe bronchi causing high-grade narrowing of the aforementioned bronchi as well as transient occlusion of the right middle lobe bronchus. There is also partial " obstruction of a subsegmental right middle lobe bronchus and obstruction of multiple right lower lobe segmental bronchi that eventually clear in the subsegmental branches.  While the majority of this is likely either aspiration or dense mucous plugging, underlying endobronchial lesion contributing to backup of mucus is not excluded. Recommend pulmonology consultation. If bronchoscopy is not pursued, recommend repeat chest CT in 3-4 weeks after symptoms improved.  No acute pulmonary embolism.  + Emphysema.    ED medications administered:  500 ml IV NS bolus x1  100mg IV Doxycyline x1  Duoneb x1     Past Medical History  Allergic rhinitis, cause unspecified   AMD (age-related macular degeneration), bilateral   Atrial fibrillation (HCC)   PPM   Cataract   CKD (chronic kidney disease), stage III (HCC) 7/7/2019   Diastolic dysfunction   Esophageal reflux   Essential hypertension, benign   Exudative age-related macular degeneration, bilateral, with active choroidal neovascularization (HCC)   Glaucoma suspect of left eye   Glaucoma suspect, right eye   Other and unspecified hyperlipidemia   Pacemaker malfunction 7/7/2019   Paroxysmal atrial fibrillation (HCC) 7/7/2019   Primary open angle glaucoma of right eye, mild stage   Pseudophakia of both eyes   Pseudophakia of left eye   Skin cancer   Type II or unspecified type diabetes mellitus without mention of complication, not stated as uncontrolled   Vitreous hemorrhage, left eye (HCC) 4/27/15   Surgical History  ANES PERMANENT TRANSVENOUS PACEMAKER INSERTION 12/2000   Permanent Pacemaker   EYLEA (AFLIBERCEPT) 2MG INTRAVITREAL INJECTION OD (RIGHT EYE) Right 11/05/2024   LEFT HEART CATH,PERCUTANEOUS   Cardiac cath, L heart, no stents   PAST SURGICAL HISTORY OF   4 sinus surgeries   PFIZER-Aunt Kitchen COVID-19 VACCINE, AGE 12+ YR (PURPLE TOP)   all THREE doses   REMOVE CATARACT, INSERT LENS, INTRACAPSUL Right 03/03/2016   with laser assist, Dr. PORTIA Mcconnell   TONSILLECTOMY &  ADENOIDECTOMY <AGE 12   Tonsil/adenoidectomy   VITRECTOMY MECHANICAL PARS PLANA 5/7/15 os   PPV ([place] Vitrectomy)   XCAPSL CTRC RMVL INSJ IO LENS PROSTH W/O ECP Left 2010   Dr. Church     Social History  He reports that he has quit smoking. His smoking use included cigarettes. He has never used smokeless tobacco. No history on file for alcohol use and drug use.    Family History  No family history on file.     Allergies  Aspirin, Atorvastatin, Codeine, Fenofibrate, Gemfibrozil, Lisinopril, Niacin, Quinapril, Quinine, and Warfarin    Review of Systems   HENT:  Positive for postnasal drip.    Respiratory:  Positive for cough and shortness of breath.    Cardiovascular:  Positive for chest pain.     Physical Exam  Constitutional: NAD, pt alert and cooperative, [place]  Eyes: no icterus  ENMT: mucous membranes moist  Head/Neck: Neck supple  Respiratory/Thorax: non-labored breathing, no cough, on RA  Cardiovascular: Regular rate and rhythm  Gastrointestinal: Nondistended  : no [place]  Musculoskeletal: ROM intact  Extremities:  no edema  Neurological: A&O x 3, speech clear, follows commands appropriately    Last Recorded Vitals  /59   Pulse 79   Temp 36.3 °C (97.4 °F) (Oral)   Resp (!) 26   Wt 78.5 kg (173 lb)   SpO2 95%     Relevant Results  Scheduled medications  [START ON 4/16/2025] doxycycline, 100 mg, intravenous, q12h  heparin (porcine), 5,000 Units, subcutaneous, q8h  insulin lispro, 0-5 Units, subcutaneous, Before meals & nightly  ipratropium-albuteroL, 3 mL, nebulization, q4h  [START ON 4/16/2025] polyethylene glycol, 17 g, oral, Daily      Continuous medications     PRN medications  PRN medications: acetaminophen **OR** acetaminophen **OR** acetaminophen, dextrose, dextrose, glucagon, glucagon, ondansetron **OR** ondansetron  Results for orders placed or performed during the hospital encounter of 04/15/25 (from the past 24 hours)   CBC and Auto Differential   Result Value Ref Range    WBC 8.2 4.4  - 11.3 x10*3/uL    nRBC 0.0 0.0 - 0.0 /100 WBCs    RBC 3.65 (L) 4.50 - 5.90 x10*6/uL    Hemoglobin 11.0 (L) 13.5 - 17.5 g/dL    Hematocrit 35.3 (L) 41.0 - 52.0 %    MCV 97 80 - 100 fL    MCH 30.1 26.0 - 34.0 pg    MCHC 31.2 (L) 32.0 - 36.0 g/dL    RDW 14.8 (H) 11.5 - 14.5 %    Platelets 265 150 - 450 x10*3/uL    Neutrophils % 64.2 40.0 - 80.0 %    Immature Granulocytes %, Automated 1.2 (H) 0.0 - 0.9 %    Lymphocytes % 21.9 13.0 - 44.0 %    Monocytes % 10.8 2.0 - 10.0 %    Eosinophils % 1.2 0.0 - 6.0 %    Basophils % 0.7 0.0 - 2.0 %    Neutrophils Absolute 5.25 1.60 - 5.50 x10*3/uL    Immature Granulocytes Absolute, Automated 0.10 0.00 - 0.50 x10*3/uL    Lymphocytes Absolute 1.79 0.80 - 3.00 x10*3/uL    Monocytes Absolute 0.88 (H) 0.05 - 0.80 x10*3/uL    Eosinophils Absolute 0.10 0.00 - 0.40 x10*3/uL    Basophils Absolute 0.06 0.00 - 0.10 x10*3/uL   Comprehensive Metabolic Panel   Result Value Ref Range    Glucose 141 (H) 74 - 99 mg/dL    Sodium 136 136 - 145 mmol/L    Potassium 5.1 3.5 - 5.3 mmol/L    Chloride 103 98 - 107 mmol/L    Bicarbonate 26 21 - 32 mmol/L    Anion Gap 12 10 - 20 mmol/L    Urea Nitrogen 36 (H) 6 - 23 mg/dL    Creatinine 1.73 (H) 0.50 - 1.30 mg/dL    eGFR 37 (L) >60 mL/min/1.73m*2    Calcium 9.5 8.6 - 10.3 mg/dL    Albumin 4.5 3.4 - 5.0 g/dL    Alkaline Phosphatase 40 33 - 136 U/L    Total Protein 7.5 6.4 - 8.2 g/dL    AST 39 9 - 39 U/L    Bilirubin, Total 0.6 0.0 - 1.2 mg/dL    ALT 16 10 - 52 U/L   Magnesium   Result Value Ref Range    Magnesium 2.18 1.60 - 2.40 mg/dL   Protime-INR   Result Value Ref Range    Protime 12.8 (H) 9.8 - 12.4 seconds    INR 1.2 (H) 0.9 - 1.1   B-type natriuretic peptide   Result Value Ref Range     (H) 0 - 99 pg/mL   Sars-CoV-2 and Influenza A/B PCR   Result Value Ref Range    Flu A Result Not Detected Not Detected    Flu B Result Not Detected Not Detected    Coronavirus 2019, PCR Not Detected Not Detected   RSV PCR   Result Value Ref Range    RSV PCR Not  Detected Not Detected   Troponin I, High Sensitivity, Initial   Result Value Ref Range    Troponin I, High Sensitivity 13 0 - 20 ng/L   Troponin, High Sensitivity, 1 Hour   Result Value Ref Range    Troponin I, High Sensitivity 14 0 - 20 ng/L     CT angio chest for pulmonary embolism    Result Date: 4/15/2025  Interpreted By:  Campbell Peterson, STUDY: CT ANGIO CHEST FOR PULMONARY EMBOLISM;  4/15/2025 5:55 pm   INDICATION: Signs/Symptoms:Chest pain, shortness of breath.     COMPARISON: 11/14/2022   ACCESSION NUMBER(S): [id number]   ORDERING CLINICIAN: FLACO STORM   TECHNIQUE: Contiguous axial images of the chest were obtained after the intravenous administration of iodinated contrast using angiographic PE protocol. Coronal and sagittal reformatted images were reconstructed from the axial data. MIP images were created on an independent workstation and reviewed.   FINDINGS:   MEDIASTINUM AND LYMPH NODES:  The esophageal wall appears within normal limits.  No enlarged intrathoracic or axillary lymph nodes by imaging criteria. No pneumomediastinum.   VESSELS:  Normal caliber thoracic aorta without dissection. Mild aortic atherosclerosis.  No acute pulmonary embolism.   HEART: Mildly enlarged due to four-chamber dilatation.  Severe coronary artery calcifications. No significant pericardial effusion.   LUNG, AIRWAYS, PLEURA: Emphysema. Mild bibasilar atelectasis. There is a filling defect in the bronchus intermedius that extends into the right middle and right lower lobe bronchi causing high-grade narrowing of the aforementioned bronchi as well as transient occlusion of the right middle lobe bronchus. There is also partial obstruction of a subsegmental right middle lobe bronchus and obstruction of multiple right lower lobe segmental bronchi that eventually clear in the subsegmental branches.   OSSEOUS STRUCTURES: No acute osseous abnormality.   CHEST WALL SOFT TISSUES: No discernible acute abnormality.   UPPER  ABDOMEN/OTHER: No acute abnormality. Simple 4.9 cm left renal cyst.       1. Relatively dense and discrete filling defect in the bronchus intermedius that extends into the right middle and right lower lobe bronchi causing high-grade narrowing of the aforementioned bronchi as well as transient occlusion of the right middle lobe bronchus. There is also partial obstruction of a subsegmental right middle lobe bronchus and obstruction of multiple right lower lobe segmental bronchi that eventually clear in the subsegmental branches.  While the majority of this is likely either aspiration or dense mucous plugging, underlying endobronchial lesion contributing to backup of mucus is not excluded. Recommend pulmonology consultation. If bronchoscopy is not pursued, recommend repeat chest CT in 3-4 weeks after symptoms improved.   2. No acute pulmonary embolism.   3. Emphysema.   MACRO: None.   Signed by: Campbell Peterson 4/15/2025 6:25 PM Dictation workstation:   [id number]    XR chest 1 view    Result Date: 4/15/2025  Interpreted By:  Kimberly Monzon, STUDY: XR CHEST 1 VIEW;  4/15/2025 4:00 pm   INDICATION: Signs/Symptoms:Chest Pain.   COMPARISON: 12/23/2023   ACCESSION NUMBER(S): [id number]   ORDERING CLINICIAN: FLACO STORM   FINDINGS: CARDIOMEDIASTINAL SILHOUETTE: Cardiomediastinal silhouette is normal in size and configuration. There is a left subclavian pacemaker with two tips in the right atrium and a single tip in the right ventricle.   LUNGS: Lungs are clear.   ABDOMEN: No remarkable upper abdominal findings.     BONES: No acute osseous changes.       No acute cardiopulmonary process.   MACRO: None   Signed by: Kimberly Monzon 4/15/2025 4:16 PM Dictation workstation:   [id number]        Assessment/Plan   Assessment & Plan    Sonny Heart is a 89 y.o. male with a past medical history of paroxysmal A-fib, PPM, CKD, diastolic dysfunction, former smoker who presents to [facility] ED for complaints of  "shortness breath. Daughter at beside assisting in HPI. Patient has been having shortness breath over the last 2 to 4 days. Reports shortness of breath occurs at rest and occasionally on exertion. Additionally reports intermittent chest discomfort, describes it as \"pulsating\", and an intensity of a 5 out of 10. States he is currently chest pain free.  He states that he has had a cough and it has been nonproductive. He reports that he has been dealing with a post nasal drip for over a month now, visited with ENT who suggest nasal spray and mucinex at night. He states this has helped, but hasn't cleared completely. Today he woke up, still not feeling well, continued to have shortness of breath, call daughter he suggested he go to Urgent Care. At , they listened to his lungs and directed him to [facility]. Denies fever, abdominal pain, N/V/D. Reports some swelling to lower extremities, more in the right > L, feels its related to his arthritis. Reports that he takes Lasix as needed and has been below his typical weight however states he is also losing weight due to not eating as much at his current facility as he does not like the food.     ED work up notable for:  EKG: Atrial- sensed ventricular- paced rhythm, 77 bpm  Vital signs: 36.3 (97.4), HR 82, RR 26, /67, 97% on room air  Labs: glucose 141, BUN 36/ Crt 1.71, EGFR 37, , Troponin 13-->14, H&H 11.0/35.3. viral panel negative  CXR: Negative   CTA Chest: Relatively dense and discrete filling defect in the bronchus intermedius that extends into the right middle and right lower lobe bronchi causing high-grade narrowing of the aforementioned bronchi as well as transient occlusion of the right middle lobe bronchus. There is also partial obstruction of a subsegmental right middle lobe bronchus and obstruction of multiple right lower lobe segmental bronchi that eventually clear in the subsegmental branches.  While the majority of this is likely either aspiration " or dense mucous plugging, underlying endobronchial lesion contributing to backup of mucus is not excluded. Recommend pulmonology consultation. If bronchoscopy is not pursued, recommend repeat chest CT in 3-4 weeks after symptoms improved.  No acute pulmonary embolism.  + Emphysema.    #Shortness of breath and chest pain  - Presented with shortness of breath and chest pain over the last 4 days  - Has been dealing with ongoing PND >1 month. Saw ENT; recs mucinex and nasal spray  - ED work up revealed no hypoxia; however tachypnea noted ( 21-28 bpm)   - Troponins 13-->14  - EKG non ischemic; currently chest pain free   - CT Chest: reviewed ( see above)   - Started on IV doxycycline and duonebs (felt better after nebulizer)  - Appreciate Pulmonology consult   -   - ECHO 11/11/2022 LVEF 60-65%, elevated RSVP  - Will obtain new ECHO  - Consider Cardiology input    #CKD 3  - Bun/ crt appears at baseline  - continue to monitor; daily CMP  - avoid nephrotoxic agents     #GERD  - Continue PPI BID    #HTN  - stable; continue home regimen    #DM II  - Hypoglycemia protocol  - diabetic diet and sliding scale insulin     GI/VTE PPX: PPI, SQ heparin  Code Status: Full Code    Chart, medical history, and labs/testing reviewed in detail.   Case and plan of care to be discussed with attending provider.      Disposition: Discharge home once medically cleared and stable    KAROL Mendoza-CNP   Observation/Internal Med [facility]  [facility]  04/15/25  10:33 PM  Total time of 45 minutes spent on professional and overall care, with >50% of time dedicated to counseling/coordination of care.    Virtual or Telephone Consent     An interactive audio and video telecommunication system which permits real time communications between the patient (at the originating site) and provider (at the distant site) was utilized to provide this telehealth service.   Verbal consent was requested and obtained from (Sonny Heart) on this  date (4/15/202) for a telehealth visit.

## 2025-04-15 NOTE — PROGRESS NOTES
This pt requires contrast administration for emergency imaging to investigate potential acute thoracic pathology.  Despite known renal disease, risks of delaying or omitting contrast administration do not outweigh the benefits. I suspect that delaying or omitting contrast administration would increase this patient's risk of debilitating or life-threatening consequences.     Mic Blackmon MD

## 2025-04-16 ENCOUNTER — APPOINTMENT (OUTPATIENT)
Dept: CARDIOLOGY | Facility: HOSPITAL | Age: OVER 89
End: 2025-04-16
Payer: MEDICARE

## 2025-04-16 LAB
ALBUMIN SERPL BCP-MCNC: 3.7 G/DL (ref 3.4–5)
ALP SERPL-CCNC: 30 U/L (ref 33–136)
ALT SERPL W P-5'-P-CCNC: 12 U/L (ref 10–52)
ANION GAP SERPL CALC-SCNC: 13 MMOL/L (ref 10–20)
AORTIC VALVE PEAK VELOCITY: 1.79 M/S
AST SERPL W P-5'-P-CCNC: 21 U/L (ref 9–39)
ATRIAL RATE: 77 BPM
AV PEAK GRADIENT: 13 MMHG
AVA (PEAK VEL): 1.76 CM2
BILIRUB SERPL-MCNC: 0.4 MG/DL (ref 0–1.2)
BUN SERPL-MCNC: 36 MG/DL (ref 6–23)
CALCIUM SERPL-MCNC: 8.9 MG/DL (ref 8.6–10.3)
CHLORIDE SERPL-SCNC: 107 MMOL/L (ref 98–107)
CO2 SERPL-SCNC: 23 MMOL/L (ref 21–32)
CREAT SERPL-MCNC: 1.66 MG/DL (ref 0.5–1.3)
EGFRCR SERPLBLD CKD-EPI 2021: 39 ML/MIN/1.73M*2
EJECTION FRACTION APICAL 4 CHAMBER: 48.9
EJECTION FRACTION: 54 %
ERYTHROCYTE [DISTWIDTH] IN BLOOD BY AUTOMATED COUNT: 14.9 % (ref 11.5–14.5)
GLUCOSE BLD MANUAL STRIP-MCNC: 112 MG/DL (ref 74–99)
GLUCOSE BLD MANUAL STRIP-MCNC: 136 MG/DL (ref 74–99)
GLUCOSE BLD MANUAL STRIP-MCNC: 144 MG/DL (ref 74–99)
GLUCOSE BLD MANUAL STRIP-MCNC: 181 MG/DL (ref 74–99)
GLUCOSE SERPL-MCNC: 115 MG/DL (ref 74–99)
HCT VFR BLD AUTO: 29.4 % (ref 41–52)
HGB BLD-MCNC: 9.4 G/DL (ref 13.5–17.5)
LEFT ATRIUM VOLUME AREA LENGTH INDEX BSA: 33 ML/M2
LEFT VENTRICLE INTERNAL DIMENSION DIASTOLE: 4.45 CM (ref 3.5–6)
LEFT VENTRICULAR OUTFLOW TRACT DIAMETER: 1.86 CM
MCH RBC QN AUTO: 30.4 PG (ref 26–34)
MCHC RBC AUTO-ENTMCNC: 32 G/DL (ref 32–36)
MCV RBC AUTO: 95 FL (ref 80–100)
MITRAL VALVE E/A RATIO: 0.89
NRBC BLD-RTO: 0 /100 WBCS (ref 0–0)
P AXIS: 64 DEGREES
P OFFSET: 122 MS
P ONSET: 89 MS
PLATELET # BLD AUTO: 194 X10*3/UL (ref 150–450)
POTASSIUM SERPL-SCNC: 4.1 MMOL/L (ref 3.5–5.3)
PR INTERVAL: 202 MS
PROT SERPL-MCNC: 6.1 G/DL (ref 6.4–8.2)
Q ONSET: 190 MS
QRS COUNT: 13 BEATS
QRS DURATION: 186 MS
QT INTERVAL: 464 MS
QTC CALCULATION(BAZETT): 525 MS
QTC FREDERICIA: 504 MS
R AXIS: -80 DEGREES
RBC # BLD AUTO: 3.09 X10*6/UL (ref 4.5–5.9)
RIGHT VENTRICLE PEAK SYSTOLIC PRESSURE: 50.4 MMHG
SODIUM SERPL-SCNC: 139 MMOL/L (ref 136–145)
T AXIS: 78 DEGREES
T OFFSET: 422 MS
TRICUSPID ANNULAR PLANE SYSTOLIC EXCURSION: 1.7 CM
TSH SERPL-ACNC: 1.42 MIU/L (ref 0.44–3.98)
VENTRICULAR RATE: 77 BPM
WBC # BLD AUTO: 5.7 X10*3/UL (ref 4.4–11.3)

## 2025-04-16 PROCEDURE — 2500000004 HC RX 250 GENERAL PHARMACY W/ HCPCS (ALT 636 FOR OP/ED)

## 2025-04-16 PROCEDURE — 92610 EVALUATE SWALLOWING FUNCTION: CPT | Mod: GN

## 2025-04-16 PROCEDURE — 99223 1ST HOSP IP/OBS HIGH 75: CPT | Performed by: INTERNAL MEDICINE

## 2025-04-16 PROCEDURE — 96372 THER/PROPH/DIAG INJ SC/IM: CPT

## 2025-04-16 PROCEDURE — 80053 COMPREHEN METABOLIC PANEL: CPT

## 2025-04-16 PROCEDURE — 2500000002 HC RX 250 W HCPCS SELF ADMINISTERED DRUGS (ALT 637 FOR MEDICARE OP, ALT 636 FOR OP/ED)

## 2025-04-16 PROCEDURE — 97161 PT EVAL LOW COMPLEX 20 MIN: CPT | Mod: GP

## 2025-04-16 PROCEDURE — 94640 AIRWAY INHALATION TREATMENT: CPT

## 2025-04-16 PROCEDURE — 36415 COLL VENOUS BLD VENIPUNCTURE: CPT

## 2025-04-16 PROCEDURE — 85027 COMPLETE CBC AUTOMATED: CPT

## 2025-04-16 PROCEDURE — 97165 OT EVAL LOW COMPLEX 30 MIN: CPT | Mod: GO

## 2025-04-16 PROCEDURE — 2500000002 HC RX 250 W HCPCS SELF ADMINISTERED DRUGS (ALT 637 FOR MEDICARE OP, ALT 636 FOR OP/ED): Performed by: INTERNAL MEDICINE

## 2025-04-16 PROCEDURE — 93306 TTE W/DOPPLER COMPLETE: CPT

## 2025-04-16 PROCEDURE — 2500000005 HC RX 250 GENERAL PHARMACY W/O HCPCS: Performed by: INTERNAL MEDICINE

## 2025-04-16 PROCEDURE — 82947 ASSAY GLUCOSE BLOOD QUANT: CPT

## 2025-04-16 PROCEDURE — 93306 TTE W/DOPPLER COMPLETE: CPT | Performed by: INTERNAL MEDICINE

## 2025-04-16 PROCEDURE — 84443 ASSAY THYROID STIM HORMONE: CPT

## 2025-04-16 PROCEDURE — 2500000001 HC RX 250 WO HCPCS SELF ADMINISTERED DRUGS (ALT 637 FOR MEDICARE OP)

## 2025-04-16 PROCEDURE — 1100000001 HC PRIVATE ROOM DAILY

## 2025-04-16 PROCEDURE — 2500000001 HC RX 250 WO HCPCS SELF ADMINISTERED DRUGS (ALT 637 FOR MEDICARE OP): Performed by: INTERNAL MEDICINE

## 2025-04-16 PROCEDURE — 99232 SBSQ HOSP IP/OBS MODERATE 35: CPT

## 2025-04-16 RX ORDER — OMEPRAZOLE 20 MG/1
40 CAPSULE, DELAYED RELEASE ORAL 2 TIMES DAILY
COMMUNITY

## 2025-04-16 RX ORDER — AZELASTINE 1 MG/ML
1 SPRAY, METERED NASAL 2 TIMES DAILY
COMMUNITY

## 2025-04-16 RX ORDER — EZETIMIBE 10 MG/1
10 TABLET ORAL DAILY
Status: DISCONTINUED | OUTPATIENT
Start: 2025-04-16 | End: 2025-04-17 | Stop reason: HOSPADM

## 2025-04-16 RX ORDER — SODIUM CHLORIDE FOR INHALATION 3 %
3 VIAL, NEBULIZER (ML) INHALATION 3 TIMES DAILY
Status: DISCONTINUED | OUTPATIENT
Start: 2025-04-16 | End: 2025-04-17 | Stop reason: HOSPADM

## 2025-04-16 RX ORDER — MICONAZOLE NITRATE 2 G/100G
1 POWDER TOPICAL DAILY
COMMUNITY

## 2025-04-16 RX ORDER — METOPROLOL SUCCINATE 25 MG/1
25 TABLET, EXTENDED RELEASE ORAL DAILY
COMMUNITY

## 2025-04-16 RX ORDER — TRAZODONE HYDROCHLORIDE 50 MG/1
150 TABLET ORAL NIGHTLY
Status: DISCONTINUED | OUTPATIENT
Start: 2025-04-16 | End: 2025-04-17 | Stop reason: HOSPADM

## 2025-04-16 RX ORDER — LATANOPROST 50 UG/ML
1 SOLUTION/ DROPS OPHTHALMIC NIGHTLY
COMMUNITY

## 2025-04-16 RX ORDER — METOPROLOL SUCCINATE 25 MG/1
25 TABLET, EXTENDED RELEASE ORAL DAILY
Status: DISCONTINUED | OUTPATIENT
Start: 2025-04-16 | End: 2025-04-17 | Stop reason: HOSPADM

## 2025-04-16 RX ORDER — LOSARTAN POTASSIUM 50 MG/1
50 TABLET ORAL DAILY
Status: DISCONTINUED | OUTPATIENT
Start: 2025-04-16 | End: 2025-04-17 | Stop reason: HOSPADM

## 2025-04-16 RX ORDER — FLUTICASONE PROPIONATE 50 MCG
2 SPRAY, SUSPENSION (ML) NASAL DAILY
Status: DISCONTINUED | OUTPATIENT
Start: 2025-04-16 | End: 2025-04-17 | Stop reason: HOSPADM

## 2025-04-16 RX ORDER — ACETAMINOPHEN 325 MG/1
650 TABLET ORAL EVERY 6 HOURS PRN
COMMUNITY

## 2025-04-16 RX ORDER — HYDRALAZINE HYDROCHLORIDE 25 MG/1
25 TABLET, FILM COATED ORAL 2 TIMES DAILY
COMMUNITY

## 2025-04-16 RX ORDER — GUAIFENESIN 600 MG/1
600 TABLET, EXTENDED RELEASE ORAL 2 TIMES DAILY
Status: DISCONTINUED | OUTPATIENT
Start: 2025-04-16 | End: 2025-04-17 | Stop reason: HOSPADM

## 2025-04-16 RX ORDER — EZETIMIBE 10 MG/1
10 TABLET ORAL DAILY
COMMUNITY

## 2025-04-16 RX ORDER — HYDRALAZINE HYDROCHLORIDE 25 MG/1
25 TABLET, FILM COATED ORAL 2 TIMES DAILY
Status: DISCONTINUED | OUTPATIENT
Start: 2025-04-16 | End: 2025-04-17 | Stop reason: HOSPADM

## 2025-04-16 RX ORDER — AZELASTINE 1 MG/ML
1 SPRAY, METERED NASAL 2 TIMES DAILY
Status: DISCONTINUED | OUTPATIENT
Start: 2025-04-16 | End: 2025-04-16 | Stop reason: ALTCHOICE

## 2025-04-16 RX ORDER — LIDOCAINE 40 MG/G
1 CREAM TOPICAL NIGHTLY
COMMUNITY
End: 2025-04-17 | Stop reason: HOSPADM

## 2025-04-16 RX ORDER — FLUTICASONE PROPIONATE 50 MCG
2 SPRAY, SUSPENSION (ML) NASAL DAILY
COMMUNITY

## 2025-04-16 RX ORDER — IPRATROPIUM BROMIDE AND ALBUTEROL SULFATE 2.5; .5 MG/3ML; MG/3ML
3 SOLUTION RESPIRATORY (INHALATION) 3 TIMES DAILY
Status: DISCONTINUED | OUTPATIENT
Start: 2025-04-16 | End: 2025-04-17 | Stop reason: HOSPADM

## 2025-04-16 RX ORDER — LORATADINE 10 MG/1
10 TABLET ORAL DAILY
COMMUNITY

## 2025-04-16 RX ORDER — LIDOCAINE 50 MG/G
2 PATCH TOPICAL DAILY
COMMUNITY

## 2025-04-16 RX ADMIN — HEPARIN SODIUM 5000 UNITS: 5000 INJECTION, SOLUTION INTRAVENOUS; SUBCUTANEOUS at 14:38

## 2025-04-16 RX ADMIN — MONTELUKAST 10 MG: 10 TABLET, FILM COATED ORAL at 21:07

## 2025-04-16 RX ADMIN — IPRATROPIUM BROMIDE AND ALBUTEROL SULFATE 3 ML: 2.5; .5 SOLUTION RESPIRATORY (INHALATION) at 20:34

## 2025-04-16 RX ADMIN — LATANOPROST 1 DROP: 50 SOLUTION/ DROPS OPHTHALMIC at 21:06

## 2025-04-16 RX ADMIN — POLYETHYLENE GLYCOL 3350 17 G: 17 POWDER, FOR SOLUTION ORAL at 09:20

## 2025-04-16 RX ADMIN — HEPARIN SODIUM 5000 UNITS: 5000 INJECTION, SOLUTION INTRAVENOUS; SUBCUTANEOUS at 21:07

## 2025-04-16 RX ADMIN — SODIUM CHLORIDE, POTASSIUM CHLORIDE, SODIUM LACTATE AND CALCIUM CHLORIDE 500 ML: 600; 310; 30; 20 INJECTION, SOLUTION INTRAVENOUS at 08:11

## 2025-04-16 RX ADMIN — TRAZODONE HYDROCHLORIDE 150 MG: 50 TABLET ORAL at 22:41

## 2025-04-16 RX ADMIN — HYDRALAZINE HYDROCHLORIDE 25 MG: 25 TABLET ORAL at 12:22

## 2025-04-16 RX ADMIN — DOXYCYCLINE 100 MG: 100 INJECTION, POWDER, LYOPHILIZED, FOR SOLUTION INTRAVENOUS at 10:19

## 2025-04-16 RX ADMIN — ASPIRIN 81 MG: 81 TABLET, COATED ORAL at 09:20

## 2025-04-16 RX ADMIN — LEVOTHYROXINE SODIUM 88 MCG: 0.09 TABLET ORAL at 05:59

## 2025-04-16 RX ADMIN — SODIUM CHLORIDE SOLN NEBU 3% 3 ML: 3 NEBU SOLN at 20:34

## 2025-04-16 RX ADMIN — LOSARTAN POTASSIUM 50 MG: 50 TABLET, FILM COATED ORAL at 12:22

## 2025-04-16 RX ADMIN — CLONIDINE HYDROCHLORIDE 0.2 MG: 0.2 TABLET ORAL at 21:07

## 2025-04-16 RX ADMIN — FLUTICASONE PROPIONATE 2 SPRAY: 50 SPRAY, METERED NASAL at 18:11

## 2025-04-16 RX ADMIN — EZETIMIBE 10 MG: 10 TABLET ORAL at 12:22

## 2025-04-16 RX ADMIN — GUAIFENESIN 600 MG: 600 TABLET ORAL at 21:07

## 2025-04-16 RX ADMIN — CLONIDINE HYDROCHLORIDE 0.2 MG: 0.2 TABLET ORAL at 09:20

## 2025-04-16 RX ADMIN — PANTOPRAZOLE SODIUM 40 MG: 40 TABLET, DELAYED RELEASE ORAL at 16:09

## 2025-04-16 RX ADMIN — DOXYCYCLINE 100 MG: 100 INJECTION, POWDER, LYOPHILIZED, FOR SOLUTION INTRAVENOUS at 21:06

## 2025-04-16 RX ADMIN — AMLODIPINE BESYLATE 10 MG: 10 TABLET ORAL at 21:07

## 2025-04-16 RX ADMIN — PANTOPRAZOLE SODIUM 40 MG: 40 TABLET, DELAYED RELEASE ORAL at 06:00

## 2025-04-16 RX ADMIN — HEPARIN SODIUM 5000 UNITS: 5000 INJECTION, SOLUTION INTRAVENOUS; SUBCUTANEOUS at 05:58

## 2025-04-16 RX ADMIN — GUAIFENESIN 600 MG: 600 TABLET ORAL at 12:22

## 2025-04-16 RX ADMIN — METOPROLOL SUCCINATE 25 MG: 25 TABLET, EXTENDED RELEASE ORAL at 12:22

## 2025-04-16 RX ADMIN — INSULIN LISPRO 1 UNITS: 100 INJECTION, SOLUTION INTRAVENOUS; SUBCUTANEOUS at 21:06

## 2025-04-16 RX ADMIN — PIOGLITAZONE 45 MG: 15 TABLET ORAL at 09:20

## 2025-04-16 RX ADMIN — AMIODARONE HYDROCHLORIDE 200 MG: 200 TABLET ORAL at 09:20

## 2025-04-16 SDOH — ECONOMIC STABILITY: FOOD INSECURITY: HOW HARD IS IT FOR YOU TO PAY FOR THE VERY BASICS LIKE FOOD, HOUSING, MEDICAL CARE, AND HEATING?: NOT HARD AT ALL

## 2025-04-16 SDOH — HEALTH STABILITY: MENTAL HEALTH: HOW MANY DRINKS CONTAINING ALCOHOL DO YOU HAVE ON A TYPICAL DAY WHEN YOU ARE DRINKING?: PATIENT DOES NOT DRINK

## 2025-04-16 SDOH — ECONOMIC STABILITY: HOUSING INSECURITY: IN THE PAST 12 MONTHS, HOW MANY TIMES HAVE YOU MOVED WHERE YOU WERE LIVING?: 0

## 2025-04-16 SDOH — HEALTH STABILITY: MENTAL HEALTH: HOW OFTEN DO YOU HAVE SIX OR MORE DRINKS ON ONE OCCASION?: NEVER

## 2025-04-16 SDOH — ECONOMIC STABILITY: INCOME INSECURITY: IN THE PAST 12 MONTHS HAS THE ELECTRIC, GAS, OIL, OR WATER COMPANY THREATENED TO SHUT OFF SERVICES IN YOUR HOME?: NO

## 2025-04-16 SDOH — ECONOMIC STABILITY: HOUSING INSECURITY: IN THE LAST 12 MONTHS, WAS THERE A TIME WHEN YOU WERE NOT ABLE TO PAY THE MORTGAGE OR RENT ON TIME?: NO

## 2025-04-16 SDOH — HEALTH STABILITY: MENTAL HEALTH: HOW OFTEN DO YOU HAVE A DRINK CONTAINING ALCOHOL?: NEVER

## 2025-04-16 SDOH — ECONOMIC STABILITY: FOOD INSECURITY: WITHIN THE PAST 12 MONTHS, THE FOOD YOU BOUGHT JUST DIDN'T LAST AND YOU DIDN'T HAVE MONEY TO GET MORE.: NEVER TRUE

## 2025-04-16 SDOH — ECONOMIC STABILITY: FOOD INSECURITY: WITHIN THE PAST 12 MONTHS, YOU WORRIED THAT YOUR FOOD WOULD RUN OUT BEFORE YOU GOT THE MONEY TO BUY MORE.: NEVER TRUE

## 2025-04-16 SDOH — ECONOMIC STABILITY: HOUSING INSECURITY: AT ANY TIME IN THE PAST 12 MONTHS, WERE YOU HOMELESS OR LIVING IN A SHELTER (INCLUDING NOW)?: NO

## 2025-04-16 SDOH — ECONOMIC STABILITY: TRANSPORTATION INSECURITY: IN THE PAST 12 MONTHS, HAS LACK OF TRANSPORTATION KEPT YOU FROM MEDICAL APPOINTMENTS OR FROM GETTING MEDICATIONS?: NO

## 2025-04-16 ASSESSMENT — ENCOUNTER SYMPTOMS
TROUBLE SWALLOWING: 0
SINUS PRESSURE: 0
BLOOD IN STOOL: 0
CONSTIPATION: 0
NECK PAIN: 0
FATIGUE: 0
DIARRHEA: 0
VOMITING: 0
BACK PAIN: 0
COUGH: 1
SORE THROAT: 0
DIZZINESS: 0
RHINORRHEA: 0
DIFFICULTY URINATING: 0
CHEST TIGHTNESS: 0
HEMATURIA: 0
SLEEP DISTURBANCE: 0
CHILLS: 0
NAUSEA: 0
WEAKNESS: 0
ARTHRALGIAS: 0
HEADACHES: 1
DYSURIA: 0
FEVER: 0
SHORTNESS OF BREATH: 1
VOICE CHANGE: 0
UNEXPECTED WEIGHT CHANGE: 0
WHEEZING: 1
EYE PAIN: 0
WOUND: 0
CONFUSION: 0

## 2025-04-16 ASSESSMENT — COGNITIVE AND FUNCTIONAL STATUS - GENERAL
MOBILITY SCORE: 22
WALKING IN HOSPITAL ROOM: A LITTLE
WALKING IN HOSPITAL ROOM: A LITTLE
CLIMB 3 TO 5 STEPS WITH RAILING: A LITTLE
CLIMB 3 TO 5 STEPS WITH RAILING: A LITTLE
HELP NEEDED FOR BATHING: A LITTLE
HELP NEEDED FOR BATHING: A LITTLE
DAILY ACTIVITIY SCORE: 23
DRESSING REGULAR LOWER BODY CLOTHING: A LITTLE
DAILY ACTIVITIY SCORE: 21
TOILETING: A LITTLE
MOBILITY SCORE: 22

## 2025-04-16 ASSESSMENT — PAIN SCALES - GENERAL
PAINLEVEL_OUTOF10: 0 - NO PAIN

## 2025-04-16 ASSESSMENT — ACTIVITIES OF DAILY LIVING (ADL)
BATHING_ASSISTANCE: STAND BY
LACK_OF_TRANSPORTATION: NO
ADL_ASSISTANCE: INDEPENDENT
ADL_ASSISTANCE: INDEPENDENT

## 2025-04-16 ASSESSMENT — LIFESTYLE VARIABLES
SKIP TO QUESTIONS 9-10: 1
AUDIT-C TOTAL SCORE: 0

## 2025-04-16 ASSESSMENT — PAIN - FUNCTIONAL ASSESSMENT: PAIN_FUNCTIONAL_ASSESSMENT: 0-10

## 2025-04-16 NOTE — PROGRESS NOTES
Speech-Language Pathology    SLP Adult Inpatient Speech-Language Pathology Clinical Swallow Evaluation    Patient Name: Sonny Heart  MRN: [id number]  Today's Date: 4/16/2025   Time Calculation  Start Time: 0949  Stop Time: 1014  Time Calculation (min): 25 min         Current Problem:   1. JOYA (acute kidney injury)        2. Bronchitis  Transthoracic Echo (TTE) Complete    Transthoracic Echo (TTE) Complete      3. Mucus plug in respiratory tract  Transthoracic Echo (TTE) Complete    Transthoracic Echo (TTE) Complete      4. Shortness of breath on exertion  Transthoracic Echo (TTE) Complete    Transthoracic Echo (TTE) Complete      Risk for Aspiration: No    Diet Recommendations:  Regular (IDDSI Level 7)  Thin (IDDSI Level 0)    Compensatory Swallowing Strategies:   Decrease distractions during eating/feeding  Upright 90 degrees as possible for all oral intake  Remain upright for 20-30 minutes after meals  Single sips  Small bites/sips  Eat/feed slowly    Medication Administration Recommendations:   Whole, With Liquid, 1-2 at a time      Plan:  SLP Plan: No skilled SLP  No Skilled SLP: Independent with swallowing  SLP - OK to Discharge: Yes      Subjective   Current Problem:  Patient was admitted due to symptoms of SOB, chest pain 4/14 and cough/congestion. The dysphagia assessment was conducted to identify current swallowing skills and to determine the least restrictive diet consistency for a safe effective swallow.   Prior to this assessment the patient was consuming a regular diet and thin liquids.      General Visit Information:  Patient Class: Observation  Living Environment: Assissted living/independent living  Ordering Physician: Gurdeep  Reason for Referral: dysphagia assessment  Past Medical History Relevant to Rehab: CKD, GERD, HTN, DM2  Prior Level of Function: Decreased function  Patient Seen During This Visit: Yes  Date of Onset: 04/15/25  Date of Order: 04/15/25  BaseLine Diet: regular/thin  Current  Diet : regular/thin      Vital Signs:   Room air      Objective   Lingual and labial skills were WFL. Patient with lower full denture, otherwise has natural dentition. Able to follow commands and make wants and needs known. Vocal quality clear.  Patient completed the three ounce water challenge without difficulty. Mastication of hard solids was WFL. Oral and pharyngeal stages of swallowing were WFL. The patient did not demonstrate any signs of penetration or aspiration with any of the consistencies presented this date.    Pain:  Pain Assessment  0-10 (Numeric) Pain Score: 0 - No pain    Oral/Motor Assessment:  Oral Hygiene: clear and moist  Dentition: Adequate/Natural, Dentures (Lower Full)  Oral Motor: Within Functional Limits      Consistencies Trialed:  Consistencies Trialed: Yes  Consistencies Trialed: Ice Chips, Thin (IDDSI Level 0) - Straw, Thin (IDDSI Level 0) - Cup, Regular (IDDSI Level 7)      Clinical Observations:  Patient Positioning: Upright in Bed  Management of Oral Secretions: Adequate  Was The 3 oz Swallow Protocol Completed: Yes      Results and recommendations were discussed with the patient, who verbalized comprehension of the information presented.   No further ST is warranted at this time.

## 2025-04-16 NOTE — PROGRESS NOTES
Pharmacy Medication History     [facility]     Additional concerns with the patient's PTA list.   N/a  Notified Provider via Haiku : Yes    The following updates were made to the Prior to Admission medication list:     Medications ADDED:   Omeprazole   Miconazole  Metoprolol   Loratadine   Lidocaine cream and patch   Latanoprost   Hydralazine   Zetia   Flonase   Tylenol   Azelastine   Medications CHANGED:  Amiodarone is once daily every day   Losartan is 1 tablet daily 50mg   Potassium is 20meq twice daily   Trazodone is 150mg   Medications REMOVED:   Coreg   Cymbalta   Lantus   Medications NOT TAKING:   N/a    Allergy reviewed : Yes    Meds 2 Beds : N/A    Outpatient pharmacy confirmed and updated in chart : Yes    Pharmacy name: [facility] pharmacy     The list below reflectives the updated PTA list. Please review each medication in order reconciliation for additional clarification and justification.    Prior to Admission Medications   Prescriptions Last Dose   acetaminophen (Tylenol) 325 mg tablet    Sig: Take 2 tablets (650 mg) by mouth every 6 hours if needed for mild pain (1 - 3).   amLODIPine (Norvasc) 10 mg tablet 4/14/2025 Bedtime   Sig: Take 1 tablet (10 mg) by mouth once daily.   amiodarone (Pacerone) 200 mg tablet 4/15/2025   Sig: Take 1 tablet (200 mg) by mouth once daily.   aspirin 81 mg EC tablet 4/15/2025   Sig: Take 1 tablet (81 mg) by mouth once daily.   azelastine (Astelin) 137 mcg (0.1 %) nasal spray    Sig: Administer 1 spray into each nostril 2 times a day. Use in each nostril as directed   cloNIDine (Catapres) 0.2 mg tablet 4/15/2025 Morning   Sig: Take 1 tablet (0.2 mg) by mouth 2 times a day.   ezetimibe (Zetia) 10 mg tablet    Sig: Take 1 tablet (10 mg) by mouth once daily.   fenofibrate (Tricor) 145 mg tablet 4/15/2025 Morning   Sig: Take 1 tablet (145 mg) by mouth once daily.   fluticasone (Flonase) 50 mcg/actuation nasal spray    Sig: Administer 2 sprays  into each nostril once daily. Shake gently. Before first use, prime pump. After use, clean tip and replace cap.   furosemide (Lasix) 20 mg tablet 4/15/2025 Morning   Sig: Take 1 tablet (20 mg) by mouth once daily.   hydrALAZINE (Apresoline) 25 mg tablet    Sig: Take 1 tablet (25 mg) by mouth 2 times a day.   latanoprost (Xalatan) 0.005 % ophthalmic solution    Sig: Administer 1 drop into the right eye once daily at bedtime.   levothyroxine (Synthroid, Levoxyl) 88 mcg tablet 4/15/2025 Morning   Sig: Take 1 tablet (88 mcg) by mouth once daily in the morning. Take before meals.   lidocaine (LMX) 4 % cream    Sig: Apply 0.1 g topically once daily at bedtime. To leg wounds for pain   lidocaine (Lidoderm) 5 % patch    Sig: Place 2 patches on the skin once daily. Remove & discard patch within 12 hours or as directed by MD. To back   loratadine (Claritin) 10 mg tablet    Sig: Take 1 tablet (10 mg) by mouth once daily.   losartan (Cozaar) 50 mg tablet 4/14/2025 Evening   Sig: Take 1 tablet (50 mg) by mouth once daily.   metoprolol succinate XL (Toprol-XL) 25 mg 24 hr tablet    Sig: Take 1 tablet (25 mg) by mouth once daily. Do not crush or chew.   miconazole (Micotin) 2 % powder    Sig: Apply 1 Application topically once daily. To feet   montelukast (Singulair) 10 mg tablet 4/14/2025 Evening   Sig: Take 1 tablet (10 mg) by mouth once daily at bedtime.   omeprazole (PriLOSEC) 20 mg DR capsule    Sig: Take 2 capsules (40 mg) by mouth 2 times a day. Do not crush or chew.   pioglitazone (Actos) 45 mg tablet 4/15/2025 Morning   Sig: Take 1 tablet (45 mg) by mouth once daily.   potassium chloride CR 20 mEq ER tablet 4/15/2025 Morning   Sig: Take 1 tablet (20 mEq) by mouth 2 times a day. Do not crush or chew.   traZODone (Desyrel) 150 mg tablet 4/14/2025 Evening   Sig: Take 1 tablet (150 mg) by mouth once daily at bedtime.      Facility-Administered Medications: None       The list below reflectives the updated allergy list. Please  review each documented allergy for additional clarification and justification.    Allergies   Allergen Reactions    Aspirin Anaphylaxis    Atorvastatin Unknown and Myalgia    Codeine Nausea And Vomiting and Unknown     Blacked out after oral surgery and was given Codeine    Fenofibrate Nausea And Vomiting and Unknown    Gemfibrozil Nausea Only and Unknown    Lisinopril Unknown    Niacin Nausea And Vomiting and Unknown    Quinapril Nausea And Vomiting and Unknown    Quinine Unknown    Warfarin Hives and Unknown     Hx of macular degeneration, can not take blood thinners    Should not take due to blood clot in the eye          04/16/25 at 9:16 AM - Fabiola Clayton

## 2025-04-16 NOTE — PROGRESS NOTES
Physical Therapy    Physical Therapy Evaluation    Patient Name: Sonny Heart  MRN: [id number]  Department: [place] [address]  Room: 120/120A  Today's Date: 4/16/2025   Time Calculation  Start Time: 0919  Stop Time: 0934  Time Calculation (min): 15 min    Assessment/Plan   PT Assessment  Rehab Prognosis: Good  Barriers to Discharge Home: No anticipated barriers  Evaluation/Treatment Tolerance: Patient tolerated treatment well  Medical Staff Made Aware: Yes  Strengths: Ability to acquire knowledge, Insight into problems, Living arrangement secure, Rehab experience  Barriers to Participation:  (N/A)  End of Session Communication: Bedside nurse  Assessment Comment: Pt presenting initially due to SOB and chest pain. Pt demonstrating ind for bed mobility and SUP to emerging ind for transfers and gait. No acute skilled PT needs noted however recommend return to LOW intensity therapy at ind living facility (was actively receiving prior to hospital admission)  End of Session Patient Position: Alarm on (seated EOB)  IP OR SWING BED PT PLAN  Inpatient or Swing Bed: Inpatient  PT Plan  PT Plan: PT Eval only  PT Eval Only Reason: No acute PT needs identified  PT Frequency: PT eval only  PT Discharge Recommendations: No further acute PT, Low intensity level of continued care  Equipment Recommended upon Discharge:  (no needs anticipated)  PT Recommended Transfer Status: Assist x1  PT - OK to Discharge: Yes (PT POC initiated this date)    Subjective   General Visit Information:  General  Reason for Referral: Pt is an 89 y/o M who presented to [facility]  from urgent care with SOB, dypnea and occasional chest pain  Referred By: Campbell Sharp CNP  Past Medical History Relevant to Rehab: CKD, GERD, HTN, DM2  Co-Treatment: OT  Co-Treatment Reason: for maximal pt safety and participation  Prior to Session Communication: Bedside nurse  Patient Position Received: Alarm on (seated EOB)  General Comment: Pt pleasant and agreeble to PT/OT  evaluations  Home Living:  Home Living  Type of Home: Independent living  Lives With: Spouse  Home Adaptive Equipment:  (3 wheeled rollator)  Home Layout: One level  Home Access: Elevator, Level entry, No concerns  Bathroom Shower/Tub: Walk-in shower  Bathroom Toilet: Standard  Bathroom Equipment: Grab bars in shower, Grab bars around toilet (shower chair)  Prior Level of Function:  Prior Function Per Pt/Caregiver Report  Level of [place]: Independent with ADLs and functional transfers  Receives Help From:  (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 Pt reports assists her intermittently with ADLs and some home management tasks however wife able to assist with home management. Wife ambulates without assistance)  ADL Assistance: Independent  Homemaking Assistance: Independent (Pt reports does own cleaning and laundry. Meals provided by ILF)  Ambulatory Assistance: Independent (3 wheeled rollator)  Prior Function Comments: (-) driving. (-) falls  Precautions:  Precautions  Medical Precautions: Fall precautions             Objective   Pain:  Pain Assessment  Pain Assessment: 0-10  0-10 (Numeric) Pain Score: 0 - No pain  Cognition:  Cognition  Overall Cognitive Status: Within Functional Limits  Orientation Level: Oriented X4  Attention: Within Functional Limits  Memory: Within Funtional Limits  Insight: Within function limits  Impulsive: Within functional limits    General Assessments:  Activity Tolerance  Endurance: Tolerates 10 - 20 min exercise with multiple rests  Activity Tolerance Comments: mild SOB noted with longer distance ambulation    Sensation  Light Touch: No apparent deficits    Coordination  Movements are Fluid and Coordinated: Yes    Static Sitting Balance  Static Sitting-Balance Support: No upper extremity supported, Feet supported  Static Sitting-Level of Assistance: Independent    Static Standing Balance  Static Standing-Balance Support: Bilateral upper extremity supported  (walker)  Static Standing-Level of Assistance: Distant supervision  Static Standing-Comment/Number of Minutes: 2 min  Functional Assessments:  Bed Mobility  Bed Mobility: Yes  Bed Mobility 1  Bed Mobility 1: Sitting to supine  Level of Assistance 1: Independent    Transfers  Transfer: Yes  Transfer 1  Transfer From 1: Sit to  Transfer to 1: Stand  Technique 1: Sit to stand, Stand to sit  Transfer Level of Assistance 1: Distant supervision, Modified independent (SUP to emerging Mod Ind)    Ambulation/Gait Training  Ambulation/Gait Training Performed: Yes  Ambulation/Gait Training 1  Surface 1: Level tile  Device 1:  (3 wheeled rollator)  Assistance 1: Distant supervision  Quality of Gait 1:  (reduced B step length and gait margaux)  Comments/Distance (ft) 1: 200ft  Extremity/Trunk Assessments:  RLE   RLE : Within Functional Limits  LLE   LLE : Within Functional Limits  Outcome Measures:  [facility] Basic Mobility  Turning from your back to your side while in a flat bed without using bedrails: None  Moving from lying on your back to sitting on the side of a flat bed without using bedrails: None  Moving to and from bed to chair (including a wheelchair): None  Standing up from a chair using your arms (e.g. wheelchair or bedside chair): None  To walk in hospital room: A little  Climbing 3-5 steps with railing: A little  Basic Mobility - Total Score: 22    Encounter Problems       Encounter Problems (Active)       Pain - Adult              Education Documentation  Precautions, taught by Michel Brady, PT at 4/16/2025 11:13 AM.  Learner: Patient  Readiness: Acceptance  Method: Explanation  Response: Verbalizes Understanding    Body Mechanics, taught by Michel Brady PT at 4/16/2025 11:13 AM.  Learner: Patient  Readiness: Acceptance  Method: Explanation  Response: Verbalizes Understanding    Mobility Training, taught by Michel Brady, PT at 4/16/2025 11:13 AM.  Learner: Patient  Readiness:  Acceptance  Method: Explanation  Response: Verbalizes Understanding    Education Comments  No comments found.

## 2025-04-16 NOTE — PROGRESS NOTES
04/16/25 1342   Discharge Planning   Living Arrangements Spouse/significant other   Support Systems Spouse/significant other   Assistance Needed none   Type of Residence Private residence   Number of Stairs to Enter Residence 0   Number of Stairs Within Residence 0   Do you have animals or pets at home? No   Who is requesting discharge planning? Provider   Home or Post Acute Services In home services   Type of Home Care Services Home PT;Home OT   Expected Discharge Disposition Home H   Does the patient need discharge transport arranged? Yes   RoundTrip coordination needed? Yes   Has discharge transport been arranged? No   Financial Resource Strain   How hard is it for you to pay for the very basics like food, housing, medical care, and heating? Not hard   Housing Stability   In the last 12 months, was there a time when you were not able to pay the mortgage or rent on time? N   In the past 12 months, how many times have you moved where you were living? 0   At any time in the past 12 months, were you homeless or living in a shelter (including now)? N   Transportation Needs   In the past 12 months, has lack of transportation kept you from medical appointments or from getting medications? no   In the past 12 months, has lack of transportation kept you from meetings, work, or from getting things needed for daily living? No   Patient Choice   Provider Choice list and CMS website (https://medicare.gov/care-compare#search) for post-acute Quality and Resource Measure Data were provided and reviewed with: Patient   Patient / Family choosing to utilize agency / facility established prior to hospitalization No   Stroke Family Assessment   Stroke Family Assessment Needed No   Intensity of Service   Intensity of Service 0-30 min     4/16/25 1343  Met with patient at bedside to discuss discharge planning.  PCP is Dr Smith Valenzuela and pharmacy is [facility] in [place].  He lives at [facility] with his wife.  He  is independent with ADLs.  He has transportation to appointments.  He will need transportation arranged at discharge.  He ambulates with a rollator.  He plans on returning home at discharge.  He states that he was getting home PT from [place] but there will be a new [facility] agency coming out.  He cannot remember the name of the agency.  Message sent to Meka Carrasco at [facility] via email to confirm [place] lives at [place] and there are no barriers to return as well as what [facility] agency is used.  Elizabeth Lisa RN TCC    4/16/25 [address] [place] [address] is Agency of choice for [facility].  Referral sent in [facility].  Elizabeth Lias RN TCC

## 2025-04-16 NOTE — PROGRESS NOTES
"Addendum: Patient upgraded to hospitalist service in patient status.  Hand off given to hospitalist team via secure chat.  Hospitalist team agrees to start managing care tomorrow.     Subjective: He is sitting in bed, some mild dyspnea while at rest, otherwise no CP, lightheadedness, dizziness, or other complaints.  Denies feeling feverish or overall ill, reports that he has started coughing up sputum.    Additional information: HDS and satting well on RA and afebrile, CBC without leukocytosis.  Cre stable at 1.66, BUN 36, will give some IVF.  His lung sounds are more diminished/clear than adventitious.  Continue with breathing treatment, pulmonary therapy management, mucinex.  Waiting for pulm to see him, and he is getting an echo.    Vitals (Last 24 Hours):  Heart Rate:  [65-95]   Temp:  [35.9 °C (96.6 °F)-37.1 °C (98.8 °F)]   Resp:  [18-28]   BP: ()/(53-98)   Height:  [173 cm (5' 8.11\")]   Weight:  [78.5 kg (173 lb)]   SpO2:  [91 %-97 %]     I have reviewed imaging reports and labs from this visit    PHYSICAL EXAM:  Constitutional: NAD, alert and cooperative  Eyes: no icterus  ENMT: mucous membranes moist, no lesions  Head/Neck: supple  Respiratory/Thorax: Diminished lung sounds all L regions, and bilateral lower lobes, endorses dyspnea at rest but does not appear in distress, moist sounding cough, on RA  Cardiovascular: RRR, no murmurs heard  Gastrointestinal: distended abdomen/S/NT  : no Harvey, no SP/flank discomfort  Musculoskeletal: no joint swelling, ROM intact  Extremities: no edema, legs are wrapped in compression stockings.  Neurological: non-focal  Skin: warm and dry  Psych: calm, stable mood     Results for orders placed or performed during the hospital encounter of 04/15/25 (from the past 24 hours)   CBC and Auto Differential   Result Value Ref Range    WBC 8.2 4.4 - 11.3 x10*3/uL    nRBC 0.0 0.0 - 0.0 /100 WBCs    RBC 3.65 (L) 4.50 - 5.90 x10*6/uL    Hemoglobin 11.0 (L) 13.5 - 17.5 g/dL    " Hematocrit 35.3 (L) 41.0 - 52.0 %    MCV 97 80 - 100 fL    MCH 30.1 26.0 - 34.0 pg    MCHC 31.2 (L) 32.0 - 36.0 g/dL    RDW 14.8 (H) 11.5 - 14.5 %    Platelets 265 150 - 450 x10*3/uL    Neutrophils % 64.2 40.0 - 80.0 %    Immature Granulocytes %, Automated 1.2 (H) 0.0 - 0.9 %    Lymphocytes % 21.9 13.0 - 44.0 %    Monocytes % 10.8 2.0 - 10.0 %    Eosinophils % 1.2 0.0 - 6.0 %    Basophils % 0.7 0.0 - 2.0 %    Neutrophils Absolute 5.25 1.60 - 5.50 x10*3/uL    Immature Granulocytes Absolute, Automated 0.10 0.00 - 0.50 x10*3/uL    Lymphocytes Absolute 1.79 0.80 - 3.00 x10*3/uL    Monocytes Absolute 0.88 (H) 0.05 - 0.80 x10*3/uL    Eosinophils Absolute 0.10 0.00 - 0.40 x10*3/uL    Basophils Absolute 0.06 0.00 - 0.10 x10*3/uL   Comprehensive Metabolic Panel   Result Value Ref Range    Glucose 141 (H) 74 - 99 mg/dL    Sodium 136 136 - 145 mmol/L    Potassium 5.1 3.5 - 5.3 mmol/L    Chloride 103 98 - 107 mmol/L    Bicarbonate 26 21 - 32 mmol/L    Anion Gap 12 10 - 20 mmol/L    Urea Nitrogen 36 (H) 6 - 23 mg/dL    Creatinine 1.73 (H) 0.50 - 1.30 mg/dL    eGFR 37 (L) >60 mL/min/1.73m*2    Calcium 9.5 8.6 - 10.3 mg/dL    Albumin 4.5 3.4 - 5.0 g/dL    Alkaline Phosphatase 40 33 - 136 U/L    Total Protein 7.5 6.4 - 8.2 g/dL    AST 39 9 - 39 U/L    Bilirubin, Total 0.6 0.0 - 1.2 mg/dL    ALT 16 10 - 52 U/L   Magnesium   Result Value Ref Range    Magnesium 2.18 1.60 - 2.40 mg/dL   Protime-INR   Result Value Ref Range    Protime 12.8 (H) 9.8 - 12.4 seconds    INR 1.2 (H) 0.9 - 1.1   B-type natriuretic peptide   Result Value Ref Range     (H) 0 - 99 pg/mL   Sars-CoV-2 and Influenza A/B PCR   Result Value Ref Range    Flu A Result Not Detected Not Detected    Flu B Result Not Detected Not Detected    Coronavirus 2019, PCR Not Detected Not Detected   RSV PCR   Result Value Ref Range    RSV PCR Not Detected Not Detected   Troponin I, High Sensitivity, Initial   Result Value Ref Range    Troponin I, High Sensitivity 13 0 - 20  ng/L   Troponin, High Sensitivity, 1 Hour   Result Value Ref Range    Troponin I, High Sensitivity 14 0 - 20 ng/L   ECG 12 lead   Result Value Ref Range    Ventricular Rate 77 BPM    Atrial Rate 77 BPM    ME Interval 202 ms    QRS Duration 186 ms    QT Interval 464 ms    QTC Calculation(Bazett) 525 ms    P Axis 64 degrees    R Axis -80 degrees    T Axis 78 degrees    QRS Count 13 beats    Q Onset 190 ms    P Onset 89 ms    P Offset 122 ms    T Offset 422 ms    QTC Fredericia 504 ms   POCT GLUCOSE   Result Value Ref Range    POCT Glucose 188 (H) 74 - 99 mg/dL   D-dimer, Non VTE   Result Value Ref Range    D-Dimer Non VTE, Quant (ng/mL FEU) 648 (H) <=500 ng/mL FEU   CBC   Result Value Ref Range    WBC 5.7 4.4 - 11.3 x10*3/uL    nRBC 0.0 0.0 - 0.0 /100 WBCs    RBC 3.09 (L) 4.50 - 5.90 x10*6/uL    Hemoglobin 9.4 (L) 13.5 - 17.5 g/dL    Hematocrit 29.4 (L) 41.0 - 52.0 %    MCV 95 80 - 100 fL    MCH 30.4 26.0 - 34.0 pg    MCHC 32.0 32.0 - 36.0 g/dL    RDW 14.9 (H) 11.5 - 14.5 %    Platelets 194 150 - 450 x10*3/uL   Comprehensive metabolic panel   Result Value Ref Range    Glucose 115 (H) 74 - 99 mg/dL    Sodium 139 136 - 145 mmol/L    Potassium 4.1 3.5 - 5.3 mmol/L    Chloride 107 98 - 107 mmol/L    Bicarbonate 23 21 - 32 mmol/L    Anion Gap 13 10 - 20 mmol/L    Urea Nitrogen 36 (H) 6 - 23 mg/dL    Creatinine 1.66 (H) 0.50 - 1.30 mg/dL    eGFR 39 (L) >60 mL/min/1.73m*2    Calcium 8.9 8.6 - 10.3 mg/dL    Albumin 3.7 3.4 - 5.0 g/dL    Alkaline Phosphatase 30 (L) 33 - 136 U/L    Total Protein 6.1 (L) 6.4 - 8.2 g/dL    AST 21 9 - 39 U/L    Bilirubin, Total 0.4 0.0 - 1.2 mg/dL    ALT 12 10 - 52 U/L   TSH with reflex to Free T4 if abnormal   Result Value Ref Range    Thyroid Stimulating Hormone 1.42 0.44 - 3.98 mIU/L   POCT GLUCOSE   Result Value Ref Range    POCT Glucose 112 (H) 74 - 99 mg/dL     MEDS:  Scheduled meds  Scheduled Medications[1]    Continuous meds  Continuous Medications[2]    PRN meds  PRN  "Medications[3]    ASSESSMENT/PLAN:    History Of Present Illness  Sonny Heart is a 89 y.o. male with a past medical history of HTN, paroxysmal A-fib s/p PPM, HFpEF, GERD, DMII, HLD, CKD3, former smoker who presents to [facility] ED for complaints of shortness breath. Daughter at beside assisting in HPI. Patient has been having shortness breath over the last 2 to 4 days. Reports shortness of breath occurs at rest and occasionally on exertion. Additionally reports intermittent chest discomfort, describes it as \"pulsating\", and an intensity of a 5 out of 10. States he is currently chest pain free.  He states that he has had a cough and it has been nonproductive. He reports that he has been dealing with a post nasal drip for over a month now, visited with ENT who suggest nasal spray and mucinex at night. He states this has helped, but hasn't cleared completely. Today he woke up, still not feeling well, continued to have shortness of breath, call daughter he suggested he go to Urgent Care. At , they listened to his lungs and directed him to [facility]. Denies fever, abdominal pain, N/V/D. Reports some swelling to lower extremities, more in the right > L, feels its related to his arthritis. Reports that he takes Lasix as needed and has been below his typical weight however states he is also losing weight due to not eating as much at his current facility as he does not like the food.      ED work up notable for:  EKG: Atrial- sensed ventricular- paced rhythm, 77 bpm  Vital signs: 36.3 (97.4), HR 82, RR 26, /67, 97% on room air  Labs: glucose 141, BUN 36/ Cre 1.71, EGFR 37, , Troponin 13-->14, H&H 11.0/35.3. viral panel negative  CXR: Negative   CTA Chest:  likely either aspiration or dense mucous plugging, underlying endobronchial lesion contributing to backup of mucus is not excluded. Recommend pulmonology consultation. If bronchoscopy is not pursued, recommend repeat chest CT in 3-4 weeks after " symptoms improved.  No acute pulmonary embolism.  + Emphysema.     ED medications administered:  500 ml IV NS bolus x1  100mg IV Doxycyline x1  Duoneb x1     #Shortness of breath and chest pain  Assessment:  - Presented with shortness of breath and chest pain over the last 4 days  - Has been dealing with ongoing Post Nasal Drip >1 month. Saw ENT; recs mucinex and nasal spray  - ED work up revealed no hypoxia; however tachypnea noted ( 21-28 bpm)   - Troponins 13-->14  - EKG non ischemic; currently chest pain free   - CT Chest: aspiration or dense mucous plugging, unable to rule out endobrachial lesion   - Started on IV doxycycline and duonebs (felt better after nebulizer)  - Bilat lower extremity edema,   - ECHO 11/11/2022 LVEF 60-65%, elevated RSVP  -Cleared by SLP, low risk for aspiration  -Echo w LVEF 54%, abnormal septal motion consistent with RV pacemaker, moderately elevated RV systolic pressure  -He does not desat when he ambulates  Plan:  - Appreciate Pulmonology consult    :Duoneb TID   :Inhaled 3% saline   :aggressive pulmonary hygiene    :Flonase    :Continue home montelukast    :PFT Vs empiric inhalers   :Desat study prior to DC    :FU ENT OP  -Continue doxy for pneumonia/asp/bronchitis   - Consider Cardiology input  -Duo nebs and resp care consult     #CKD 3  - Bun/ crt appears at baseline  - continue to monitor; daily CMP  - avoid nephrotoxic agents   -Will give gentle fluids today for elevated BUN     #GERD  - Continue PPI BID     #HTN  - stable; continue home regimen Norvasc 10 clonidine .2 mg bid, losartan 50 mg daily, hydralazine 25 mg BID    HLD  -Continue home zetia 10 mg daily     Afib  -home amiodarone and metop  -Not on AC  -Cards consult    #DM II  - Hypoglycemia protocol  -ACHS  - diabetic diet and sliding scale insulin   -Continue home actos 45 daily    Hypothyroid  -TSH WNL  -continue home synthroid      DVT Prophylaxis  -sub q heparin    DC Disposition  -from facility    Other  comorbidities as above  -continue medications as ordered and adjust based on clinical course     Discussed with Dr. Nicol Mackenzie  and the interdisciplinary team     Campbell Sharp, KAROL-CNP          [1] amiodarone, 200 mg, oral, Daily  amLODIPine, 10 mg, oral, Nightly  aspirin, 81 mg, oral, Daily  cloNIDine, 0.2 mg, oral, BID  doxycycline, 100 mg, intravenous, q12h  heparin (porcine), 5,000 Units, subcutaneous, q8h  insulin lispro, 0-5 Units, subcutaneous, Before meals & nightly  lactated Ringer's, 500 mL, intravenous, Once  latanoprost, 1 drop, Right Eye, Nightly  levothyroxine, 88 mcg, oral, Daily  losartan, 50 mg, oral, Daily  metoprolol succinate XL, 25 mg, oral, Daily  montelukast, 10 mg, oral, Nightly  pantoprazole, 40 mg, oral, BID AC  pioglitazone, 45 mg, oral, Daily  polyethylene glycol, 17 g, oral, Daily  traZODone, 50 mg, oral, Nightly     [2]    [3] PRN medications: acetaminophen **OR** acetaminophen **OR** acetaminophen, dextrose, dextrose, glucagon, glucagon, guaiFENesin, ipratropium-albuteroL, ondansetron **OR** ondansetron

## 2025-04-16 NOTE — CONSULTS
Pulmonary Consultation Note   SUBJECTIVE    Sonny Heart is a 89 y.o. year old male patient with a past medical history of HFpEF, Afib, SSS s/p PPM, dyslipidemia, hypertension, CKD, legally blind admitted on 4/15/2025 with shortness of breath and chest pain.    Pulmonary was consulted for abnormal CTA chest which showed relatively dense and discrete filling defect in the bronchus causing narrowing and transient occlusion in bronchus.    History of Present Illness:  Patient reports that he started noticing shortness of breath at rest and on exertion 1-2 weeks ago which gradually progressed. He reports the shortness of breath feels about the same today. He reports he has also been coughing for the past 1-2 weeks. He coughed up sputum earlier today but could not see what color it was. He otherwise denies any sputum production. He reports noticing wheezing over the past couple of days. He reports he continues to have chest pain. He reports his right lower leg tends to swell occasionally and he wears compression stockings. He reports he constantly has post-nasal drip; he mentions having had sinus issues his whole life and has underwent 4 sinus surgeries in the past. He recently saw ENT who recommended he take Mucinex and use nasal sprays - Flonase and Astelin.    Other than a history of sinus issues, patient denies any pulmonary history including asthma or COPD. He is a former smoker who quit about 50 years ago. No PFTs on file, does not follow with Pulmonary.     He was seen by the inpatient Pulmonary consult service in November 2022. He was admitted for a hip fracture and underwent surgical repair. He was extubated and required BiPAP. He underwent a bronch in PACU which showed a right bronchus clot which was removed. He was treated for acute hypoxic respiratory failure related to atelectasis versus pneumonia; received IV antibiotics. He was also treated for possible COPD with DuoNebs.    This admission, CTA chest  for PE study was negative for PE, showed emphysema, and also showed multiple areas of concern to the RML and RLL for aspiration or dense mucous plugging. BNP elevated at 127, trops normal. COVID-19/influenza/RSV swabs negative. TTE was completed today and showed EF of 54%, abnormal septal wall motion consistent with RV pacer, and moderate elevated RVSP of 50.4 mmHg.    SLP evaluated patient today for concerns of coughing and congestion. He was not found to have any signs of penetration or aspiration during their evaluation.    States he has had a gradual  onset, progressive SOB x 4-5 days, associated with cough that now has become productive. Also complains of moderate  wheezing.  The rest as above.     PMH: CKD, HFpEF, HTN, Afib not on anticoagulation, SSS s/p PPM, DLP, DM     PSH: PPM placement, L hip surgery     Social History:  Smoking History:  Cigarette Smoking: Quit about 50 years ago. Smoked for 10-15 years total, 1 PPD or less.    He reports that he has quit smoking. His smoking use included cigarettes. He has never used smokeless tobacco. No history on file for alcohol use and drug use.    Family History:  Family History[1]  Current Outpatient Medications   Medication Instructions    acetaminophen (TYLENOL) 650 mg, oral, Every 6 hours PRN    amiodarone (PACERONE) 200 mg, oral, Daily    amLODIPine (NORVASC) 10 mg, Daily    aspirin 81 mg, Daily    azelastine (Astelin) 137 mcg (0.1 %) nasal spray 1 spray, Each Nostril, 2 times daily, Use in each nostril as directed    cloNIDine (CATAPRES) 0.2 mg, 2 times daily    ezetimibe (ZETIA) 10 mg, oral, Daily    fenofibrate (TRICOR) 145 mg, Daily    fluticasone (Flonase) 50 mcg/actuation nasal spray 2 sprays, Each Nostril, Daily, Shake gently. Before first use, prime pump. After use, clean tip and replace cap.    furosemide (LASIX) 20 mg, Daily    hydrALAZINE (APRESOLINE) 25 mg, oral, 2 times daily    latanoprost (Xalatan) 0.005 % ophthalmic solution 1 drop, Right Eye,  Nightly    levothyroxine (SYNTHROID, LEVOXYL) 88 mcg, oral, Daily before breakfast    lidocaine (Lidoderm) 5 % patch 2 patches, transdermal, Daily, Remove & discard patch within 12 hours or as directed by MD. To back     lidocaine (LMX) 4 % cream 1 Application, Topical, Nightly, To leg wounds for pain     loratadine (CLARITIN) 10 mg, oral, Daily    losartan (COZAAR) 50 mg, oral, Daily    metoprolol succinate XL (TOPROL-XL) 25 mg, oral, Daily, Do not crush or chew.    miconazole (Micotin) 2 % powder 1 Application, Topical, Daily, To feet    montelukast (SINGULAIR) 10 mg, Nightly    omeprazole (PRILOSEC) 40 mg, oral, 2 times daily, Do not crush or chew.    pioglitazone (ACTOS) 45 mg, Daily    potassium chloride CR 20 mEq ER tablet 20 mEq, oral, 2 times daily, Do not crush or chew.    traZODone (DESYREL) 150 mg, oral, Nightly   Allergies:  Aspirin, Atorvastatin, Codeine, Fenofibrate, Gemfibrozil, Lisinopril, Niacin, Quinapril, Quinine, and Warfarin  Review of Systems   Constitutional:  Negative for chills, fatigue, fever and unexpected weight change.   HENT:  Positive for postnasal drip. Negative for congestion, mouth sores, rhinorrhea, sinus pressure, sore throat, trouble swallowing and voice change.    Eyes:  Negative for pain and visual disturbance.   Respiratory:  Positive for cough, shortness of breath and wheezing. Negative for chest tightness.    Cardiovascular:  Positive for chest pain. Negative for leg swelling.   Gastrointestinal:  Negative for blood in stool, constipation, diarrhea, nausea and vomiting.   Genitourinary:  Negative for difficulty urinating, dysuria and hematuria.   Musculoskeletal:  Negative for arthralgias, back pain and neck pain.   Skin:  Negative for rash and wound.   Allergic/Immunologic: Negative for environmental allergies.   Neurological:  Positive for headaches (history of migraines). Negative for dizziness and weakness.   Psychiatric/Behavioral:  Negative for confusion and sleep  "disturbance.      MEDICATIONS   Scheduled Medications:  Scheduled Medications[2]    Continuous Medications:  Continuous Medications[3]    PRN Medications:  PRN Medications[4]   OBJECTIVE      VITAL SIGNS     /69 (BP Location: Right arm, Patient Position: Lying)   Pulse 73   Temp 35.9 °C (96.6 °F) (Temporal)   Resp 18   Wt 78.5 kg (173 lb)   SpO2 95%     Blood pressure 145/69, pulse 73, temperature 35.9 °C (96.6 °F), temperature source Temporal, resp. rate 18, height 1.73 m (5' 8.11\"), weight 78.5 kg (173 lb), SpO2 95%.     PHYSICAL EXAM     Physical Exam  Constitutional:       General: He is not in acute distress.  HENT:      Nose:      Right Sinus: No maxillary sinus tenderness or frontal sinus tenderness.      Left Sinus: No maxillary sinus tenderness or frontal sinus tenderness.      Mouth/Throat:      Mouth: Mucous membranes are moist. No oral lesions.      Comments: No thrush  Neck:      Thyroid: No thyromegaly.      Vascular: No JVD.      Trachea: Phonation normal. No tracheal deviation.      Comments: No stridor  Cardiovascular:      Rate and Rhythm: Normal rate and regular rhythm.      Heart sounds: Normal heart sounds, S1 normal and S2 normal. No murmur heard.     No friction rub. No gallop.   Pulmonary:      Effort: No tachypnea, bradypnea, accessory muscle usage, prolonged expiration or respiratory distress.      Breath sounds: Normal air entry. No stridor. No wheezing, rhonchi or rales.      Comments: Mild inspiratory wheezing/squawked also reduced air entry at the right base.  Abdominal:      General: Bowel sounds are normal. There is no distension.      Palpations: Abdomen is soft.      Tenderness: There is no abdominal tenderness.   Musculoskeletal:      Right lower leg: No edema.      Left lower leg: No edema.   Lymphadenopathy:      Head:      Right side of head: No submandibular adenopathy.      Left side of head: No submandibular adenopathy.      Cervical: No cervical adenopathy.      " Upper Body:      Right upper body: No supraclavicular adenopathy.      Left upper body: No supraclavicular adenopathy.   Skin:     General: Skin is warm.      Findings: No lesion or rash.   Neurological:      General: No focal deficit present.      Mental Status: He is alert and oriented to person, place, and time.      Cranial Nerves: Cranial nerves 2-12 are intact.      Motor: Motor function is intact.   Psychiatric:         Mood and Affect: Mood and affect normal.       I/Os       Intake/Output Summary (Last 24 hours) at 4/16/2025 1038  Last data filed at 4/16/2025 0110  Gross per 24 hour   Intake 600 ml   Output 850 ml   Net -250 ml       LABS     Results from last 72 hours   Lab Units 04/16/25  0517 04/15/25  1532   SODIUM mmol/L 139 136   POTASSIUM mmol/L 4.1 5.1   CHLORIDE mmol/L 107 103   CO2 mmol/L 23 26   BUN mg/dL 36* 36*   CREATININE mg/dL 1.66* 1.73*   GLUCOSE mg/dL 115* 141*   CALCIUM mg/dL 8.9 9.5   ANION GAP mmol/L 13 12   EGFR mL/min/1.73m*2 39* 37*      Results from last 72 hours   Lab Units 04/16/25  0517 04/15/25  1532   WBC AUTO x10*3/uL 5.7 8.2   HEMOGLOBIN g/dL 9.4* 11.0*   HEMATOCRIT % 29.4* 35.3*   PLATELETS AUTO x10*3/uL 194 265   NEUTROS PCT AUTO %  --  64.2   LYMPHS PCT AUTO %  --  21.9   MONOS PCT AUTO %  --  10.8   EOS PCT AUTO %  --  1.2       Latest Reference Range & Units 04/15/25 15:32   BNP 0 - 99 pg/mL 127 (H)   (H): Data is abnormally high     Latest Reference Range & Units 04/15/25 15:32 04/15/25 16:58   Troponin I, High Sensitivity 0 - 20 ng/L 13 14      Latest Reference Range & Units 04/15/25 22:21   D-Dimer Non VTE, Quant (ng/mL FEU) <=500 ng/mL  (H)   (H): Data is abnormally high     Latest Reference Range & Units 04/15/25 15:32   Flu A Result Not Detected  Not Detected   Flu B Result Not Detected  Not Detected   RSV PCR Not Detected  Not Detected   Coronavirus 2019, PCR Not Detected  Not Detected     IMAGING/TESTING     PFTs:  None on file.    CT ANGIO CHEST FOR  PULMONARY EMBOLISM; 4/15/2025 5:55 pm:  IMPRESSION:  1. Relatively dense and discrete filling defect in the bronchus  intermedius that extends into the right middle and right lower lobe  bronchi causing high-grade narrowing of the aforementioned bronchi as  well as transient occlusion of the right middle lobe bronchus. There  is also partial obstruction of a subsegmental right middle lobe  bronchus and obstruction of multiple right lower lobe segmental  bronchi that eventually clear in the subsegmental branches.  While  the majority of this is likely either aspiration or dense mucous  plugging, underlying endobronchial lesion contributing to backup of  mucus is not excluded. Recommend pulmonology consultation. If  bronchoscopy is not pursued, recommend repeat chest CT in 3-4 weeks  after symptoms improved.      2. No acute pulmonary embolism.      3. Emphysema.    TRANSTHORACIC ECHOCARDIOGRAM 4/16/2025:  CONCLUSIONS:   1. Left ventricular ejection fraction is normal, calculated by Cervantes's biplane at 54%.   2. Abnormal septal motion consistent with RV pacemaker.   3. Moderately elevated right ventricular systolic pressure.    IMPRESSION   Sonny Heart is a 89 y.o. year old male patient with a past medical history of HFpEF, Afib, SSS s/p PPM, dyslipidemia, hypertension, CKD, legally blind  presented on 4/15/2025 with shortness of breath and chest pain, CT  showed mucus plugging, admitted for further work up.     Abnormal CT chest, mucous plugging  CTA chest for PE study was negative for PE, showed emphysema, and also showed multiple areas of concern to the RML and RLL for aspiration or dense mucous plugging.  Patient reporting shortness of breath at rest and on exertion, wheezing, coughing, and minimal expectoration of mucous.   Started on doxycyline per primary for possible pneumonia/bronchitis.  SLP evaluated patient today - no concern for aspiration.  Patient currently on room air.  Shortness of  breath  Patient reporting SOB on exertion and at rest for the past 1-2 weeks, gradual onset, feels about the same today.  Patient is on room air.  CTA chest for PE study was negative for PE and otherwise showed emphysema and multiple areas of concern to the RML and RLL for aspiration or dense mucous plugging.   TTE today and showed EF of 54%, abnormal septal wall motion consistent with RV pacer, and moderate elevated RVSP of 50.4 mmHg. BNP marginally elevated. Elevated RV pressure likely  group 2, However might be due to hypoxia. Might need further work up.   Post-nasal drip  Patient reports issues with PND for most of his life.  Follows with ENT outpatient. Currently using Mucinex, Flonase, and Astelin.  Former smoker  Quit about 50 years ago. Smoked for 10-15 years total, 1 PPD or less. Patient also with emphysema on chest  CT  Not on inhaler at home.   ECOMMENDATIONS   Abnormal CT chest, mucous plugging  Continue BPH with IS and Acapella.  Continue DuoNebs will add ATC and also 3% saline  Continue antibiotics per primary.  Shortness of breath  Continue BPH and BD as above.  Cardiology consult pending.  FU for pulmonary after DC  Might need PFT vs empiric  inhalers given emphysema  on chest CT  Desaturation studies before DC.   Post-nasal drip  Continue Mucinex; start Flonase, and Astelin while inpatient and resume on discharge.  Continue home Singulair  Follow-up with ENT outpatient as directed.  Former smoker  Encouraged to continue to abstain from smoking.  Does not qualify for lung cancer screening with annual LDCT chest (due to age and years since quitting).    Neida Barkley, APRN-CNP   04/16/25 at 10:38 AM        [1] No family history on file.  [2] amiodarone, 200 mg, oral, Daily  amLODIPine, 10 mg, oral, Nightly  aspirin, 81 mg, oral, Daily  cloNIDine, 0.2 mg, oral, BID  doxycycline, 100 mg, intravenous, q12h  guaiFENesin, 600 mg, oral, BID  heparin (porcine), 5,000 Units, subcutaneous, q8h  insulin  lispro, 0-5 Units, subcutaneous, Before meals & nightly  lactated Ringer's, 500 mL, intravenous, Once  latanoprost, 1 drop, Right Eye, Nightly  levothyroxine, 88 mcg, oral, Daily  losartan, 50 mg, oral, Daily  metoprolol succinate XL, 25 mg, oral, Daily  montelukast, 10 mg, oral, Nightly  pantoprazole, 40 mg, oral, BID AC  pioglitazone, 45 mg, oral, Daily  polyethylene glycol, 17 g, oral, Daily  traZODone, 50 mg, oral, Nightly  [3]    [4] PRN medications: acetaminophen **OR** acetaminophen **OR** acetaminophen, dextrose, dextrose, glucagon, glucagon, ipratropium-albuteroL, ondansetron **OR** ondansetron

## 2025-04-16 NOTE — CARE PLAN
The clinical goals for the shift include vitals within normal range    Problem: Pain - Adult  Goal: Verbalizes/displays adequate comfort level or baseline comfort level  Outcome: Progressing     Problem: Discharge Planning  Goal: Discharge to home or other facility with appropriate resources  Outcome: Progressing     Problem: Chronic Conditions and Co-morbidities  Goal: Patient's chronic conditions and co-morbidity symptoms are monitored and maintained or improved  Outcome: Progressing     Problem: Nutrition  Goal: Nutrient intake appropriate for maintaining nutritional needs  Outcome: Progressing     Problem: Fall/Injury  Goal: Not fall by end of shift  Outcome: Progressing  Goal: Be free from injury by end of the shift  Outcome: Progressing  Goal: Use assistive devices by end of the shift  Outcome: Progressing  Goal: Pace activities to prevent fatigue by end of the shift  Outcome: Progressing

## 2025-04-16 NOTE — PROGRESS NOTES
Occupational Therapy    Evaluation    Patient Name: Sonny Heart  MRN: [id number]  Department: [place] [id number]  Room: [address] [place] [address]  Today's Date: 4/16/2025  Time Calculation  Start Time: 0920  Stop Time: 0934  Time Calculation (min): 14 min        Assessment:  OT Assessment: Pt presents very close to baseline in self care and fxnl mob. Pt would benefit from resuming low intensity OT at dc to maximzie safety in home environment.  Prognosis: Good  Barriers to Discharge Home: No anticipated barriers  Evaluation/Treatment Tolerance: Patient tolerated treatment well  Medical Staff Made Aware: Yes  End of Session Communication: Bedside nurse  End of Session Patient Position: Bed, 3 rail up, Alarm on  Prognosis: Good  Barriers to Discharge: None  Evaluation/Treatment Tolerance: Patient tolerated treatment well  Medical Staff Made Aware: Yes  Strengths: Ability to acquire knowledge, Attitude of self, Insight into problems, Housing layout  Barriers to Participation: Comorbidities  Plan:  No Skilled OT: At baseline function  OT Frequency: OT eval only  OT Discharge Recommendations: Low intensity level of continued care  Equipment Recommended upon Discharge:  (none)  OT Recommended Transfer Status: Independent  OT - OK to Discharge: Yes       Subjective   Current Problem:  1. JOYA (acute kidney injury)        2. Bronchitis  Transthoracic Echo (TTE) Complete    Transthoracic Echo (TTE) Complete      3. Mucus plug in respiratory tract  Transthoracic Echo (TTE) Complete    Transthoracic Echo (TTE) Complete      4. Shortness of breath on exertion  Transthoracic Echo (TTE) Complete    Transthoracic Echo (TTE) Complete        General:  General  Reason for Referral: Pt is an 89 y/o M who [facility] care with SOB, dypnea and occasional chest pain  Referred By: Campbell Sharp CNP  Past Medical History Relevant to Rehab: CKD, GERD, HTN, DM2  Co-Treatment: PT  Co-Treatment Reason: for maximal pt safety and  participation  Prior to Session Communication: Bedside nurse  Patient Position Received: Alarm on (EOB)  General Comment: Pt pleasant and agreeble to PT/OT evaluations  Precautions:  Medical Precautions: Fall precautions            Pain:       Objective   Cognition:  Overall Cognitive Status: Within Functional Limits  Orientation Level: Oriented X4  Attention: Within Functional Limits  Memory: Within Funtional Limits  Insight: Within function limits  Impulsive: Within functional limits           Home Living:  Type of Home: Independent living  Lives With: Spouse  Home Adaptive Equipment:  (3 wheeled walker)  Home Layout: One level  Home Access: Elevator, Level entry, No concerns  Bathroom Shower/Tub: Walk-in shower  Bathroom Toilet: Standard  Bathroom Equipment: Grab bars in shower, Grab bars around toilet (shower chair)  Prior Function:  Level of [place]: Independent with ADLs and functional transfers  Receives Help From:  (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 Pt reports assists her intermittently with ADLs and some home management tasks however wife able to assist with home management. Wife ambulates without assistance)  ADL Assistance: Independent  Homemaking Assistance: Independent (Pt reports does own cleaning and laundry. Meals provided by ILF)  Ambulatory Assistance: Independent (3 W rollator)  Prior Function Comments: (-) driving. (-) falls  IADL History:  Current License: No  ADL:  Eating Assistance: Independent  Grooming Assistance: Modified independent (Device)  Grooming Deficit: Increased time to complete  Bathing Assistance: Stand by  Bathing Deficit: Supervision/safety, Steadying  UE Dressing Assistance: Independent  LE Dressing Assistance: Independent  Toileting Assistance with Device: Modified independent  Toileting Deficit: Supervison/safety, Increased time to complete  Activity Tolerance:  Endurance: Tolerates 10 - 20 min exercise with multiple rests  Activity Tolerance  Comments: mild SOB noted with longer distance ambulation  Bed Mobility/Transfers: Bed Mobility 1  Bed Mobility 1: Sitting to supine  Level of Assistance 1: Independent    Transfer 1  Transfer From 1: Sit to  Transfer to 1: Stand  Technique 1: Sit to stand, Stand to sit  Transfer Level of Assistance 1: Distant supervision, Modified independent    Functional Mobility:  Functional Mobility  Functional Mobility Performed: Yes  Functional Mobility 1  Surface 1: Level tile  Device 1:  (3 wheeled rollator)  Assistance 1: Distant supervision  Comments 1: max HH distances, occ cues for speed  Sitting Balance:  Static Sitting Balance  Static Sitting-Balance Support: Feet supported  Static Sitting-Level of Assistance: Independent  Dynamic Sitting Balance  Dynamic Sitting-Balance Support: Feet supported  Dynamic Sitting-Level of Assistance: Independent  Standing Balance:  Static Standing Balance  Static Standing-Balance Support: Bilateral upper extremity supported  Static Standing-Level of Assistance: Independent  Dynamic Standing Balance  Dynamic Standing-Balance Support: Bilateral upper extremity supported  Dynamic Standing-Level of Assistance: Independent   Vision:Vision - Basic Assessment  Visual History: Macular degeneration  Sensation:  Light Touch: No apparent deficits    Coordination:  Movements are Fluid and Coordinated: Yes   Hand Function:  Gross Grasp: Functional  Coordination: Functional  Extremities: RUE   RUE : Within Functional Limits and LUE   LUE: Within Functional Limits    Outcome Measures:[facility] Daily Activity  Putting on and taking off regular lower body clothing: None  Bathing (including washing, rinsing, drying): A little  Putting on and taking off regular upper body clothing: None  Toileting, which includes using toilet, bedpan or urinal: None  Taking care of personal grooming such as brushing teeth: None  Eating Meals: None  Daily Activity - Total Score: 23    Education Documentation  Body Mechanics,  taught by Sammi Villa OT at 4/16/2025 11:27 AM.  Learner: Patient  Readiness: Acceptance  Method: Explanation  Response: Verbalizes Understanding    Precautions, taught by Sammi Villa OT at 4/16/2025 11:27 AM.  Learner: Patient  Readiness: Acceptance  Method: Explanation  Response: Verbalizes Understanding    ADL Training, taught by Sammi Villa OT at 4/16/2025 11:27 AM.  Learner: Patient  Readiness: Acceptance  Method: Explanation  Response: Verbalizes Understanding    Education Comments  No comments found.

## 2025-04-17 VITALS
HEART RATE: 74 BPM | TEMPERATURE: 97.7 F | HEIGHT: 68 IN | WEIGHT: 173 LBS | SYSTOLIC BLOOD PRESSURE: 143 MMHG | RESPIRATION RATE: 18 BRPM | OXYGEN SATURATION: 98 % | BODY MASS INDEX: 26.22 KG/M2 | DIASTOLIC BLOOD PRESSURE: 62 MMHG

## 2025-04-17 PROBLEM — J44.1 ACUTE EXACERBATION OF EMPHYSEMA: Status: ACTIVE | Noted: 2025-04-17

## 2025-04-17 PROBLEM — R07.9 CHEST PAIN: Status: RESOLVED | Noted: 2023-12-23 | Resolved: 2025-04-17

## 2025-04-17 PROBLEM — J43.9 ACUTE EXACERBATION OF EMPHYSEMA: Status: ACTIVE | Noted: 2025-04-17

## 2025-04-17 LAB
ANION GAP SERPL CALC-SCNC: 15 MMOL/L (ref 10–20)
BASOPHILS # BLD AUTO: 0.04 X10*3/UL (ref 0–0.1)
BASOPHILS NFR BLD AUTO: 0.8 %
BUN SERPL-MCNC: 40 MG/DL (ref 6–23)
CALCIUM SERPL-MCNC: 8.9 MG/DL (ref 8.6–10.3)
CHLORIDE SERPL-SCNC: 108 MMOL/L (ref 98–107)
CO2 SERPL-SCNC: 21 MMOL/L (ref 21–32)
CREAT SERPL-MCNC: 1.76 MG/DL (ref 0.5–1.3)
EGFRCR SERPLBLD CKD-EPI 2021: 37 ML/MIN/1.73M*2
EOSINOPHIL # BLD AUTO: 0.12 X10*3/UL (ref 0–0.4)
EOSINOPHIL NFR BLD AUTO: 2.3 %
ERYTHROCYTE [DISTWIDTH] IN BLOOD BY AUTOMATED COUNT: 14.9 % (ref 11.5–14.5)
GLUCOSE BLD MANUAL STRIP-MCNC: 104 MG/DL (ref 74–99)
GLUCOSE BLD MANUAL STRIP-MCNC: 129 MG/DL (ref 74–99)
GLUCOSE BLD MANUAL STRIP-MCNC: 160 MG/DL (ref 74–99)
GLUCOSE SERPL-MCNC: 110 MG/DL (ref 74–99)
HCT VFR BLD AUTO: 28.2 % (ref 41–52)
HGB BLD-MCNC: 9.1 G/DL (ref 13.5–17.5)
IMM GRANULOCYTES # BLD AUTO: 0.03 X10*3/UL (ref 0–0.5)
IMM GRANULOCYTES NFR BLD AUTO: 0.6 % (ref 0–0.9)
LYMPHOCYTES # BLD AUTO: 1.74 X10*3/UL (ref 0.8–3)
LYMPHOCYTES NFR BLD AUTO: 33.5 %
MCH RBC QN AUTO: 30.8 PG (ref 26–34)
MCHC RBC AUTO-ENTMCNC: 32.3 G/DL (ref 32–36)
MCV RBC AUTO: 96 FL (ref 80–100)
MONOCYTES # BLD AUTO: 0.55 X10*3/UL (ref 0.05–0.8)
MONOCYTES NFR BLD AUTO: 10.6 %
NEUTROPHILS # BLD AUTO: 2.72 X10*3/UL (ref 1.6–5.5)
NEUTROPHILS NFR BLD AUTO: 52.2 %
NRBC BLD-RTO: 0 /100 WBCS (ref 0–0)
PLATELET # BLD AUTO: 198 X10*3/UL (ref 150–450)
POTASSIUM SERPL-SCNC: 4.2 MMOL/L (ref 3.5–5.3)
RBC # BLD AUTO: 2.95 X10*6/UL (ref 4.5–5.9)
SODIUM SERPL-SCNC: 140 MMOL/L (ref 136–145)
WBC # BLD AUTO: 5.2 X10*3/UL (ref 4.4–11.3)

## 2025-04-17 PROCEDURE — 82947 ASSAY GLUCOSE BLOOD QUANT: CPT

## 2025-04-17 PROCEDURE — 36415 COLL VENOUS BLD VENIPUNCTURE: CPT

## 2025-04-17 PROCEDURE — 2500000002 HC RX 250 W HCPCS SELF ADMINISTERED DRUGS (ALT 637 FOR MEDICARE OP, ALT 636 FOR OP/ED): Performed by: INTERNAL MEDICINE

## 2025-04-17 PROCEDURE — 99233 SBSQ HOSP IP/OBS HIGH 50: CPT | Performed by: INTERNAL MEDICINE

## 2025-04-17 PROCEDURE — 2500000002 HC RX 250 W HCPCS SELF ADMINISTERED DRUGS (ALT 637 FOR MEDICARE OP, ALT 636 FOR OP/ED)

## 2025-04-17 PROCEDURE — 2500000001 HC RX 250 WO HCPCS SELF ADMINISTERED DRUGS (ALT 637 FOR MEDICARE OP)

## 2025-04-17 PROCEDURE — 2500000004 HC RX 250 GENERAL PHARMACY W/ HCPCS (ALT 636 FOR OP/ED): Mod: JZ

## 2025-04-17 PROCEDURE — 94640 AIRWAY INHALATION TREATMENT: CPT

## 2025-04-17 PROCEDURE — 2500000004 HC RX 250 GENERAL PHARMACY W/ HCPCS (ALT 636 FOR OP/ED)

## 2025-04-17 PROCEDURE — 85025 COMPLETE CBC W/AUTO DIFF WBC: CPT

## 2025-04-17 PROCEDURE — 99239 HOSP IP/OBS DSCHRG MGMT >30: CPT | Performed by: STUDENT IN AN ORGANIZED HEALTH CARE EDUCATION/TRAINING PROGRAM

## 2025-04-17 PROCEDURE — 82374 ASSAY BLOOD CARBON DIOXIDE: CPT

## 2025-04-17 PROCEDURE — 2500000005 HC RX 250 GENERAL PHARMACY W/O HCPCS: Performed by: INTERNAL MEDICINE

## 2025-04-17 RX ORDER — ALBUTEROL SULFATE 90 UG/1
1 INHALANT RESPIRATORY (INHALATION) ONCE
Status: CANCELLED | OUTPATIENT
Start: 2025-04-17

## 2025-04-17 RX ORDER — ALBUTEROL SULFATE 0.83 MG/ML
3 SOLUTION RESPIRATORY (INHALATION) ONCE
Status: CANCELLED | OUTPATIENT
Start: 2025-04-17 | End: 2025-04-17

## 2025-04-17 RX ORDER — GUAIFENESIN 600 MG/1
600 TABLET, EXTENDED RELEASE ORAL 2 TIMES DAILY
Qty: 30 TABLET | Refills: 0 | Status: SHIPPED | OUTPATIENT
Start: 2025-04-17

## 2025-04-17 RX ADMIN — PANTOPRAZOLE SODIUM 40 MG: 40 TABLET, DELAYED RELEASE ORAL at 06:10

## 2025-04-17 RX ADMIN — DOXYCYCLINE 100 MG: 100 INJECTION, POWDER, LYOPHILIZED, FOR SOLUTION INTRAVENOUS at 08:49

## 2025-04-17 RX ADMIN — LEVOTHYROXINE SODIUM 88 MCG: 0.09 TABLET ORAL at 06:10

## 2025-04-17 RX ADMIN — FLUTICASONE PROPIONATE 2 SPRAY: 50 SPRAY, METERED NASAL at 08:48

## 2025-04-17 RX ADMIN — SODIUM CHLORIDE SOLN NEBU 3% 3 ML: 3 NEBU SOLN at 14:07

## 2025-04-17 RX ADMIN — HEPARIN SODIUM 5000 UNITS: 5000 INJECTION, SOLUTION INTRAVENOUS; SUBCUTANEOUS at 06:10

## 2025-04-17 RX ADMIN — POLYETHYLENE GLYCOL 3350 17 G: 17 POWDER, FOR SOLUTION ORAL at 08:50

## 2025-04-17 RX ADMIN — HYDRALAZINE HYDROCHLORIDE 25 MG: 25 TABLET ORAL at 08:49

## 2025-04-17 RX ADMIN — EZETIMIBE 10 MG: 10 TABLET ORAL at 08:48

## 2025-04-17 RX ADMIN — IPRATROPIUM BROMIDE AND ALBUTEROL SULFATE 3 ML: 2.5; .5 SOLUTION RESPIRATORY (INHALATION) at 14:07

## 2025-04-17 RX ADMIN — PIOGLITAZONE 45 MG: 15 TABLET ORAL at 08:48

## 2025-04-17 RX ADMIN — PANTOPRAZOLE SODIUM 40 MG: 40 TABLET, DELAYED RELEASE ORAL at 15:54

## 2025-04-17 RX ADMIN — ASPIRIN 81 MG: 81 TABLET, COATED ORAL at 08:48

## 2025-04-17 RX ADMIN — IPRATROPIUM BROMIDE AND ALBUTEROL SULFATE 3 ML: 2.5; .5 SOLUTION RESPIRATORY (INHALATION) at 08:09

## 2025-04-17 RX ADMIN — HEPARIN SODIUM 5000 UNITS: 5000 INJECTION, SOLUTION INTRAVENOUS; SUBCUTANEOUS at 14:19

## 2025-04-17 RX ADMIN — SODIUM CHLORIDE SOLN NEBU 3% 3 ML: 3 NEBU SOLN at 08:10

## 2025-04-17 RX ADMIN — METOPROLOL SUCCINATE 25 MG: 25 TABLET, EXTENDED RELEASE ORAL at 08:54

## 2025-04-17 RX ADMIN — LOSARTAN POTASSIUM 50 MG: 50 TABLET, FILM COATED ORAL at 08:48

## 2025-04-17 RX ADMIN — GUAIFENESIN 600 MG: 600 TABLET ORAL at 08:49

## 2025-04-17 RX ADMIN — AMIODARONE HYDROCHLORIDE 200 MG: 200 TABLET ORAL at 08:49

## 2025-04-17 RX ADMIN — CLONIDINE HYDROCHLORIDE 0.2 MG: 0.2 TABLET ORAL at 08:49

## 2025-04-17 ASSESSMENT — COGNITIVE AND FUNCTIONAL STATUS - GENERAL
MOBILITY SCORE: 22
DRESSING REGULAR LOWER BODY CLOTHING: A LITTLE
DAILY ACTIVITIY SCORE: 23
WALKING IN HOSPITAL ROOM: A LITTLE
CLIMB 3 TO 5 STEPS WITH RAILING: A LITTLE

## 2025-04-17 ASSESSMENT — PAIN SCALES - GENERAL: PAINLEVEL_OUTOF10: 0 - NO PAIN

## 2025-04-17 NOTE — CARE PLAN
The patient's goals for the shift include to get well to go home.    The clinical goals for the shift include to keep pt safe and free from falls.      Problem: Fall/Injury  Goal: Not fall by end of shift  Outcome: Progressing     Problem: Fall/Injury  Goal: Use assistive devices by end of the shift  Outcome: Progressing

## 2025-04-17 NOTE — DISCHARGE INSTRUCTIONS
Mr. Heart it was a pleasure taking care of you.  You were admitted for a mucus plug that has resolved. It is very important that you use the acapella and incentive spirometer at home to prevent this mucus plugging from happening again.   Best,  Your Internal Medicine Team

## 2025-04-17 NOTE — DISCHARGE INSTR - APPOINTMENTS
HOME CARE INFORMATION    [facility] [phone]  THEY WILL PROVIDE PT AND OT  THEY WILL REACH OUT TO YOU WITHIN 48 HOURS OF DISCHARGE TO SCHEDULE FIRST VISIT

## 2025-04-17 NOTE — PROGRESS NOTES
Sonny Heart is a 89 y.o. male on day 1 of admission presenting with No Principal Problem: There is no principal problem currently on the Problem List. Please update the Problem List and refresh..  Patient with a past medical history of HFpEF, Afib, SSS s/p PPM, dyslipidemia, hypertension, CKD, legally blind  presented on 4/15/2025 with shortness of breath and chest pain, CT  showed mucus plugging, admitted for further work up. Pulmonary is consulted to assist with his care.  Subjective   No acute overnight events. Remains on RA.      Overall is feeling markedly improved. SOB improved. Was able to cough up significant amount of mucus after receiving 3% saline with improvement of his symptoms. Denies wheezing or any pains. Wants to go home.   Objective   Scheduled medications  Scheduled Medications[1]  Continuous medications  Continuous Medications[2]  PRN medications  PRN Medications[3]   Physical Exam  Constitutional:       General: He is not in acute distress.     Appearance: He is obese. He is not ill-appearing or toxic-appearing.   HENT:      Head: Normocephalic and atraumatic.      Nose:      Comments: On RA     Mouth/Throat:      Mouth: Mucous membranes are moist.      Comments: Mallampati 2  Eyes:      General: No scleral icterus.     Comments: Legally blind   Cardiovascular:      Rate and Rhythm: Normal rate and regular rhythm.      Heart sounds: No murmur heard.     No friction rub. No gallop.   Pulmonary:      Effort: No respiratory distress.      Breath sounds: No wheezing or rales.      Comments: Clear lung fields b/l with fair air entry.   Abdominal:      General: Bowel sounds are normal. There is no distension.      Palpations: Abdomen is soft.      Tenderness: There is no abdominal tenderness.   Musculoskeletal:         General: No tenderness. Normal range of motion.      Cervical back: Normal range of motion and neck supple. No rigidity.      Right lower leg: No edema.      Left lower leg: No  "edema.   Lymphadenopathy:      Cervical: No cervical adenopathy.   Skin:     General: Skin is warm and dry.      Coloration: Skin is not jaundiced.   Neurological:      General: No focal deficit present.      Mental Status: He is alert and oriented to person, place, and time.      Cranial Nerves: No cranial nerve deficit.      Motor: No weakness.   Psychiatric:         Mood and Affect: Mood normal.         Behavior: Behavior normal.     Last Recorded Vitals  Blood pressure 131/61, pulse 65, temperature 35.7 °C (96.3 °F), temperature source Temporal, resp. rate 18, height 1.73 m (5' 8.11\"), weight 78.5 kg (173 lb), SpO2 97%.  Intake/Output last 3 Shifts:  I/O last 3 completed shifts:  In: 1200 (15.3 mL/kg) [IV Piggyback:1200]  Out: 1650 (21 mL/kg) [Urine:1650 (0.6 mL/kg/hr)]  Weight: 78.5 kg     Relevant Results  Results for orders placed or performed during the hospital encounter of 04/15/25 (from the past 24 hours)   POCT GLUCOSE   Result Value Ref Range    POCT Glucose 144 (H) 74 - 99 mg/dL   POCT GLUCOSE   Result Value Ref Range    POCT Glucose 181 (H) 74 - 99 mg/dL   Basic Metabolic Panel   Result Value Ref Range    Glucose 110 (H) 74 - 99 mg/dL    Sodium 140 136 - 145 mmol/L    Potassium 4.2 3.5 - 5.3 mmol/L    Chloride 108 (H) 98 - 107 mmol/L    Bicarbonate 21 21 - 32 mmol/L    Anion Gap 15 10 - 20 mmol/L    Urea Nitrogen 40 (H) 6 - 23 mg/dL    Creatinine 1.76 (H) 0.50 - 1.30 mg/dL    eGFR 37 (L) >60 mL/min/1.73m*2    Calcium 8.9 8.6 - 10.3 mg/dL   CBC and Auto Differential   Result Value Ref Range    WBC 5.2 4.4 - 11.3 x10*3/uL    nRBC 0.0 0.0 - 0.0 /100 WBCs    RBC 2.95 (L) 4.50 - 5.90 x10*6/uL    Hemoglobin 9.1 (L) 13.5 - 17.5 g/dL    Hematocrit 28.2 (L) 41.0 - 52.0 %    MCV 96 80 - 100 fL    MCH 30.8 26.0 - 34.0 pg    MCHC 32.3 32.0 - 36.0 g/dL    RDW 14.9 (H) 11.5 - 14.5 %    Platelets 198 150 - 450 x10*3/uL    Neutrophils % 52.2 40.0 - 80.0 %    Immature Granulocytes %, Automated 0.6 0.0 - 0.9 %    " Lymphocytes % 33.5 13.0 - 44.0 %    Monocytes % 10.6 2.0 - 10.0 %    Eosinophils % 2.3 0.0 - 6.0 %    Basophils % 0.8 0.0 - 2.0 %    Neutrophils Absolute 2.72 1.60 - 5.50 x10*3/uL    Immature Granulocytes Absolute, Automated 0.03 0.00 - 0.50 x10*3/uL    Lymphocytes Absolute 1.74 0.80 - 3.00 x10*3/uL    Monocytes Absolute 0.55 0.05 - 0.80 x10*3/uL    Eosinophils Absolute 0.12 0.00 - 0.40 x10*3/uL    Basophils Absolute 0.04 0.00 - 0.10 x10*3/uL   POCT GLUCOSE   Result Value Ref Range    POCT Glucose 104 (H) 74 - 99 mg/dL   POCT GLUCOSE   Result Value Ref Range    POCT Glucose 129 (H) 74 - 99 mg/dL   Transthoracic Echo (TTE) Complete  Result Date: 4/16/2025   [facility], [address] [place] [address]              Tel [phone] and Fax [phone] TRANSTHORACIC ECHOCARDIOGRAM REPORT  Patient Name:       SHABBIR Waldron Physician:    [id number] Artem Devi DO Study Date:         4/16/2025           Ordering Provider:    Vaishnavi PUGH MRN/PID:            [id number]            Fellow: Accession#:         [id number]        Nurse: Date of Birth/Age:  1935 / 89      Sonographer:          Joseph acevedo                                     [facility] Gender assigned at  M                   Additional Staff: Birth: Height:             173.00 cm           Admit Date: Weight:             78.50 kg            Admission Status:     Inpatient -                                                               Routine BSA / BMI:          1.92 m2 / 26.23     Encounter#:           [id number]                     kg/m2 Blood Pressure:     130/53 mmHg         Department Location:  [facility]                                                               Invasive Study Type:    TRANSTHORACIC ECHO (TTE) COMPLETE Diagnosis/ICD: Shortness of breath-R06.02  Indication:    Dyspnea CPT Code:      Echo Complete w Full Doppler-62402 Patient History: Pertinent History: Dyspnea and Chest Pain. Study Detail: The following Echo studies were performed: 2D, M-Mode, Doppler and               color flow.  PHYSICIAN INTERPRETATION: Left Ventricle: Left ventricular ejection fraction is normal, calculated by Cervantes's biplane at 54%. There are no regional left ventricular wall motion abnormalities. The left ventricular cavity size is normal. There is mildly increased septal and normal posterior left ventricular wall thickness. There is left ventricular concentric remodeling. Abnormal (paradoxical) septal motion, consistent with RV pacemaker. Spectral Doppler shows a normal pattern of left ventricular diastolic filling. Left Atrium: The left atrial size is normal. Right Ventricle: The right ventricle is normal in size. There is normal right ventricular global systolic function. A device is visualized in the right ventricle. Right Atrium: The right atrium is normal in size. There is a device visualized in the right atrium. Aortic Valve: The aortic valve is trileaflet. There is no evidence of aortic valve regurgitation. The peak instantaneous gradient of the aortic valve is 13 mmHg. Mitral Valve: The mitral valve is normal in structure. There is no evidence of mitral valve regurgitation. Tricuspid Valve: The tricuspid valve is structurally normal. There is mild tricuspid regurgitation. The Doppler estimated RVSP is moderately elevated at 50.4 mmHg. Pulmonic Valve: The pulmonic valve is structurally normal. There is no indication of pulmonic valve regurgitation. Pericardium: There is no pericardial effusion noted. Aorta: The aortic root is normal. In comparison to the previous echocardiogram(s): There are no prior studies on this patient for comparison purposes. Compared with study dated 11/11/2022, there is increased right ventricular systolic pressure.  CONCLUSIONS:  1. Left  ventricular ejection fraction is normal, calculated by Cervantes's biplane at 54%.  2. Abnormal septal motion consistent with RV pacemaker.  3. Moderately elevated right ventricular systolic pressure. QUANTITATIVE DATA SUMMARY:  2D MEASUREMENTS:          Normal Ranges: Ao Root s:       3.60 cm IVSd:            1.13 cm  (0.6-1.1cm) LVPWd:           0.97 cm  (0.6-1.1cm) LVIDd:           4.45 cm  (3.9-5.9cm) LVIDs:           3.06 cm LV Mass Index:   84 g/m2 LVEDV Index:     66 ml/m2 LV % FS          31.3 %  LEFT ATRIUM:                  Normal Ranges: LA Vol A4C:        47.3 ml    (22+/-6mL/m2) LA Vol A2C:        71.1 ml LA Vol BP:         63.6 ml LA Vol Index A4C:  24.6ml/m2 LA Vol Index A2C:  37.0 ml/m2 LA Vol Index BP:   33.0 ml/m2 LA Area A4C:       17.5 cm2 LA Area A2C:       23.5 cm2 LA Major Axis A4C: 5.5 cm LA Major Axis A2C: 6.6 cm LA Volume Index:   33.0 ml/m2 LA Vol A4C:        43.9 ml LA Vol A2C:        67.2 ml LA Vol Index BSA:  28.9 ml/m2  RIGHT ATRIUM:                 Normal Ranges: RA Vol A4C:        57.6 ml    (8.3-19.5ml) RA Vol Index A4C:  29.9 ml/m2 RA Area A4C:       20.0 cm2 RA Major Axis A4C: 5.9 cm  M-MODE MEASUREMENTS:         Normal Ranges: Ao Root:             3.60 cm (2.0-3.7cm) LAs:                 4.30 cm (2.7-4.0cm)  AORTA MEASUREMENTS:         Normal Ranges: Ao Sinus, d:        3.60 cm (2.1-3.5cm) Ao STJ, d:          2.60 cm (1.7-3.4cm) Asc Ao, d:          3.20 cm (2.1-3.4cm)  LV SYSTOLIC FUNCTION:                      Normal Ranges: EF-A4C View:    49 % (>=55%) EF-A2C View:    60 % EF-Biplane:     54 % LV EF Reported: 54 %  LV DIASTOLIC FUNCTION:           Normal Ranges: MV Peak E:             0.86 m/s  (0.7-1.2 m/s) MV Peak A:             0.97 m/s  (0.42-0.7 m/s) E/A Ratio:             0.89      (1.0-2.2) MV e'                  0.079 m/s (>8.0) MV lateral e'          0.10 m/s MV medial e'           0.06 m/s E/e' Ratio:            10.94     (<8.0)  MITRAL VALVE:          Normal Ranges: MV  DT:        183 msec (150-240msec)  AORTIC VALVE:            Normal Ranges: AoV Vmax:      1.79 m/s  (<=1.7m/s) AoV Peak P.8 mmHg (<20mmHg) LVOT Max Hemanth:  1.17 m/s  (<=1.1m/s) LVOT VTI:      25.39 cm LVOT Diameter: 1.86 cm   (1.8-2.4cm) AoV Area,Vmax: 1.76 cm2  (2.5-4.5cm2)  AORTIC INSUFFICIENCY: AI Vmax:       3.73 m/s AI Half-time:  501 msec AI Decel Time: 1728 msec AI Decel Rate: 215.76 cm/s2  RIGHT VENTRICLE: RV Basal 4.20 cm RV Mid   3.40 cm RV Major 8.0 cm TAPSE:   16.8 mm  TRICUSPID VALVE/RVSP:          Normal Ranges: Peak TR Velocity:     3.44 m/s RV Syst Pressure:     50 mmHg  (< 30mmHg) IVC Diam:             2.00 cm  PULMONIC VALVE:          Normal Ranges: PV Accel Time:  78 msec  (>120ms) PV Max Hemanth:     1.4 m/s  (0.6-0.9m/s) PV Max P.0 mmHg  AORTA: Asc Ao Diam 3.20 cm  [id number] Artem Devi DO Electronically signed on 2025 at 2:03:33 PM  ** Final **     ECG 12 lead  Result Date: 2025  Atrial-sensed ventricular-paced rhythm Abnormal ECG When compared with ECG of 23-DEC-2023 09:23, Vent. rate has increased BY  15 BPM See ED provider note for full interpretation and clinical correlation Confirmed by Jacqui Gil ([id number]) on 2025 12:11:11 PM    CT angio chest for pulmonary embolism  Result Date: 4/15/2025  Interpreted By:  Campbell Peterson, STUDY: CT ANGIO CHEST FOR PULMONARY EMBOLISM;  4/15/2025 5:55 pm   INDICATION: Signs/Symptoms:Chest pain, shortness of breath.     COMPARISON: 2022   ACCESSION NUMBER(S): [id number]   ORDERING CLINICIAN: FLACO STORM   TECHNIQUE: Contiguous axial images of the chest were obtained after the intravenous administration of iodinated contrast using angiographic PE protocol. Coronal and sagittal reformatted images were reconstructed from the axial data. MIP images were created on an independent workstation and reviewed.   FINDINGS:   MEDIASTINUM AND LYMPH NODES:  The esophageal wall appears within normal limits.  No enlarged  intrathoracic or axillary lymph nodes by imaging criteria. No pneumomediastinum.   VESSELS:  Normal caliber thoracic aorta without dissection. Mild aortic atherosclerosis.  No acute pulmonary embolism.   HEART: Mildly enlarged due to four-chamber dilatation.  Severe coronary artery calcifications. No significant pericardial effusion.   LUNG, AIRWAYS, PLEURA: Emphysema. Mild bibasilar atelectasis. There is a filling defect in the bronchus intermedius that extends into the right middle and right lower lobe bronchi causing high-grade narrowing of the aforementioned bronchi as well as transient occlusion of the right middle lobe bronchus. There is also partial obstruction of a subsegmental right middle lobe bronchus and obstruction of multiple right lower lobe segmental bronchi that eventually clear in the subsegmental branches.   OSSEOUS STRUCTURES: No acute osseous abnormality.   CHEST WALL SOFT TISSUES: No discernible acute abnormality.   UPPER ABDOMEN/OTHER: No acute abnormality. Simple 4.9 cm left renal cyst.       1. Relatively dense and discrete filling defect in the bronchus intermedius that extends into the right middle and right lower lobe bronchi causing high-grade narrowing of the aforementioned bronchi as well as transient occlusion of the right middle lobe bronchus. There is also partial obstruction of a subsegmental right middle lobe bronchus and obstruction of multiple right lower lobe segmental bronchi that eventually clear in the subsegmental branches.  While the majority of this is likely either aspiration or dense mucous plugging, underlying endobronchial lesion contributing to backup of mucus is not excluded. Recommend pulmonology consultation. If bronchoscopy is not pursued, recommend repeat chest CT in 3-4 weeks after symptoms improved.   2. No acute pulmonary embolism.   3. Emphysema.   MACRO: None.   Signed by: Campbell Peterson 4/15/2025 6:25 PM Dictation workstation:   [id number]    XR chest  1 view  Result Date: 4/15/2025  Interpreted By:  Kimberly Monzon, STUDY: XR CHEST 1 VIEW;  4/15/2025 4:00 pm   INDICATION: Signs/Symptoms:Chest Pain.   COMPARISON: 12/23/2023   ACCESSION NUMBER(S): [id number]   ORDERING CLINICIAN: FLACO STORM   FINDINGS: CARDIOMEDIASTINAL SILHOUETTE: Cardiomediastinal silhouette is normal in size and configuration. There is a left subclavian pacemaker with two tips in the right atrium and a single tip in the right ventricle.   LUNGS: Lungs are clear.   ABDOMEN: No remarkable upper abdominal findings.     BONES: No acute osseous changes.       No acute cardiopulmonary process.   MACRO: None   Signed by: Kimberly Monzon 4/15/2025 4:16 PM Dictation workstation:   [id number]    Assessment & Plan  Acute exacerbation of emphysema    Impressions:     Abnormal CT chest, mucous plugging  CTA chest for PE study was negative for PE, showed emphysema, and also showed multiple areas of concern to the RML and RLL for aspiration or dense mucous plugging.  Patient reporting shortness of breath at rest and on exertion, wheezing, coughing, and minimal expectoration of mucous. Symptoms now improved.   Started on doxycyline per primary for possible pneumonia/bronchitis.  SLP evaluated patient yesterday- no concern for aspiration.  Patient currently on room air.  Shortness of breath  Patient reporting SOB on exertion and at rest for the past 1-2 weeks, gradual onset, feels about the same today.  Patient is on room air.  CTA chest for PE study was negative for PE and otherwise showed emphysema and multiple areas of concern to the RML and RLL for aspiration or dense mucous plugging.   TTE today and showed EF of 54%, abnormal septal wall motion consistent with RV pacer, and moderate elevated RVSP of 50.4 mmHg. BNP marginally elevated. Elevated RV pressure likely  group 2, However might be due to hypoxia. Might need further work up.   Post-nasal drip  Patient reports issues with PND for most of his  life.  Follows with ENT outpatient. Currently using Mucinex, Flonase, and Astelin.  Former smoker  Quit about 50 years ago. Smoked for 10-15 years total, 1 PPD or less. Patient also with emphysema on chest  CT  Not on inhaler at home.     Recommendations:     Abnormal CT chest, mucous plugging  Continue BPH with IS and Acapella.  Continue DuoNebs will add ATC and also 3% saline  Continue antibiotics per primary.  Shortness of breath  Continue BPH and BD as above.  FU for pulmonary after DC  Might need PFT vs empiric  inhalers given emphysema  on chest CT  Desaturation studies before DC.   Post-nasal drip  Continue Mucinex; start Flonase, and Astelin while inpatient and resume on discharge.  Continue home Singulair  Follow-up with ENT outpatient as directed.  Former smoker  Encouraged to continue to abstain from smoking.  Does not qualify for lung cancer screening with annual LDCT chest (due to age and years since quitting).    OK to DC from pulmonary stand point.        [1] amiodarone, 200 mg, oral, Daily  amLODIPine, 10 mg, oral, Nightly  aspirin, 81 mg, oral, Daily  cloNIDine, 0.2 mg, oral, BID  doxycycline, 100 mg, intravenous, q12h  ezetimibe, 10 mg, oral, Daily  fluticasone, 2 spray, Each Nostril, Daily  guaiFENesin, 600 mg, oral, BID  heparin (porcine), 5,000 Units, subcutaneous, q8h  hydrALAZINE, 25 mg, oral, BID  insulin lispro, 0-5 Units, subcutaneous, Before meals & nightly  ipratropium-albuteroL, 3 mL, nebulization, TID  latanoprost, 1 drop, Right Eye, Nightly  levothyroxine, 88 mcg, oral, Daily  losartan, 50 mg, oral, Daily  metoprolol succinate XL, 25 mg, oral, Daily  montelukast, 10 mg, oral, Nightly  pantoprazole, 40 mg, oral, BID AC  pioglitazone, 45 mg, oral, Daily  polyethylene glycol, 17 g, oral, Daily  sodium chloride, 3 mL, nebulization, TID  traZODone, 150 mg, oral, Nightly  [2]    [3] PRN medications: acetaminophen **OR** acetaminophen **OR** acetaminophen, dextrose, dextrose, glucagon,  glucagon, ipratropium-albuteroL, ondansetron **OR** ondansetron

## 2025-04-17 NOTE — PROGRESS NOTES
Speech-Language Pathology                 Therapy Communication Note    Patient Name: Sonny Heart  MRN: [id number]  Department: [place] [address]  Room: 120/[facility]  Today's Date: 4/17/2025     Discipline: Speech Language Pathology    Missed Time: Cancel    Comment: Order received for repeat clinical swallow evaluation. SLP completed clinical swallow evaluation yesterday, 4/16/25, with no concern for dysphagia noted at that time. No further SLP services were recommended. This author d/w nursing and physician. Nursing reports no change in swallow function. Per physician, okay to DC repeat SLP order. Please re-consult if swallow status changes. Thank you.

## 2025-04-17 NOTE — PROGRESS NOTES
04/17/25 1513   Discharge Planning   Who is requesting discharge planning? Provider   Home or Post Acute Services In home services   Type of Home Care Services Home OT;Home PT   Expected Discharge Disposition Home Health   Does the patient need discharge transport arranged? No   Intensity of Service   Intensity of Service 0-30 min     4/17/25 1513  Patient not medically ready for discharge.  Remains on IV doxy.  Will go home with [facility] for PT/OT/SN at discharge.  Healthy at home referral placed for CHF and emphysema.  Elizabeth Lisa RN TCC    4/18/1504  Patient discharged 4/17/25.  FHCO and AVS sent to [facility] for NEGIN.  Elizabeth Lisa RN TCC

## 2025-04-18 NOTE — DISCHARGE SUMMARY
Discharge Diagnosis  Mucus plugging     Issues Requiring Follow-Up  Pulmonary follow up     Discharge Meds     Medication List      START taking these medications     guaiFENesin 600 mg 12 hr tablet; Commonly known as: Mucinex; Take 1   tablet (600 mg) by mouth 2 times a day. Do not crush, chew, or split.     CHANGE how you take these medications     lidocaine 5 % patch; Commonly known as: Lidoderm; What changed: Another   medication with the same name was removed. Continue taking this   medication, and follow the directions you see here.     CONTINUE taking these medications     acetaminophen 325 mg tablet; Commonly known as: Tylenol   amiodarone 200 mg tablet; Commonly known as: Pacerone   amLODIPine 10 mg tablet; Commonly known as: Norvasc   aspirin 81 mg EC tablet   azelastine 137 mcg (0.1 %) nasal spray; Commonly known as: Astelin   cloNIDine 0.2 mg tablet; Commonly known as: Catapres   ezetimibe 10 mg tablet; Commonly known as: Zetia   fenofibrate 145 mg tablet; Commonly known as: Tricor   fluticasone 50 mcg/actuation nasal spray; Commonly known as: Flonase   furosemide 20 mg tablet; Commonly known as: Lasix   hydrALAZINE 25 mg tablet; Commonly known as: Apresoline   latanoprost 0.005 % ophthalmic solution; Commonly known as: Xalatan   levothyroxine 88 mcg tablet; Commonly known as: Synthroid, Levoxyl   loratadine 10 mg tablet; Commonly known as: Claritin   losartan 50 mg tablet; Commonly known as: Cozaar   metoprolol succinate XL 25 mg 24 hr tablet; Commonly known as: Toprol-XL   miconazole 2 % powder; Commonly known as: Micotin   montelukast 10 mg tablet; Commonly known as: Singulair   omeprazole 20 mg DR capsule; Commonly known as: PriLOSEC   pioglitazone 45 mg tablet; Commonly known as: Actos   potassium chloride CR 20 mEq ER tablet; Commonly known as: Klor-Con M20   traZODone 150 mg tablet; Commonly known as: Desyrel       Test Results Pending At Discharge  Pending Labs       No current pending labs.             Hospital Course   Patient reports that he started noticing shortness of breath at rest and on exertion 1-2 weeks ago which gradually progressed. He reports the shortness of breath feels about the same today. He reports he has also been coughing for the past 1-2 weeks. He coughed up sputum earlier today but could not see what color it was. He otherwise denies any sputum production. He reports noticing wheezing over the past couple of days. He reports he continues to have chest pain. He reports his right lower leg tends to swell occasionally and he wears compression stockings. He reports he constantly has post-nasal drip; he mentions having had sinus issues his whole life and has underwent 4 sinus surgeries in the past. He recently saw ENT who recommended he take Mucinex and use nasal sprays - Flonase and Astelin.     Other than a history of sinus issues, patient denies any pulmonary history including asthma or COPD. He is a former smoker who quit about 50 years ago. No PFTs on file, does not follow with Pulmonary.      He was seen by the inpatient Pulmonary consult service in November 2022. He was admitted for a hip fracture and underwent surgical repair. He was extubated and required BiPAP. He underwent a bronch in PACU which showed a right bronchus clot which was removed. He was treated for acute hypoxic respiratory failure related to atelectasis versus pneumonia; received IV antibiotics. He was also treated for possible COPD with DuoNebs.     This admission, CTA chest for PE study was negative for PE, showed emphysema, and also showed multiple areas of concern to the RML and RLL for aspiration or dense mucous plugging. BNP elevated at 127, trops normal. COVID-19/influenza/RSV swabs negative. TTE was completed today and showed EF of 54%, abnormal septal wall motion consistent with RV pacer, and moderate elevated RVSP of 50.4 mmHg.     SLP evaluated patient today for concerns of coughing and congestion. He was  not found to have any signs of penetration or aspiration during their evaluation.     States he has had a gradual  onset, progressive SOB x 4-5 days, associated with cough that now has become productive. Also complains of moderate  wheezing.  Patient was treated with steroids and doxycycline for possible pneumonia/ bronchitis. Patient was able to cough up mucus plug independently. Patient was discharged with order for repeat PFTs, pulmonary follow up and instructions to take his mucinex BID instead of daily for his daily sinus congestion that is possibly what contributed to his mucus plugging     Spent >30 minutes on clinical evaluation of patient on day of discharge     Pertinent Physical Exam At Time of Discharge  Physical Exam  Constitutional:       General: He is not in acute distress.  HENT:      Nose:      Right Sinus: No maxillary sinus tenderness or frontal sinus tenderness.      Left Sinus: No maxillary sinus tenderness or frontal sinus tenderness.      Mouth/Throat:      Mouth: Mucous membranes are moist. No oral lesions.      Comments: No thrush  Neck:      Thyroid: No thyromegaly.      Vascular: No JVD.      Trachea: Phonation normal. No tracheal deviation.      Comments: No stridor  Cardiovascular:      Rate and Rhythm: Normal rate and regular rhythm.      Heart sounds: Normal heart sounds, S1 normal and S2 normal. No murmur heard.     No friction rub. No gallop.   Pulmonary:      Effort: No tachypnea, bradypnea, accessory muscle usage, prolonged expiration or respiratory distress.      Breath sounds: Normal air entry. No stridor. No wheezing, rhonchi or rales.      Comments: CTAB   Abdominal:      General: Bowel sounds are normal. There is no distension.      Palpations: Abdomen is soft.      Tenderness: There is no abdominal tenderness.   Musculoskeletal:      Right lower leg: No edema.      Left lower leg: No edema.   Lymphadenopathy:      Head:      Right side of head: No submandibular adenopathy.       Left side of head: No submandibular adenopathy.      Cervical: No cervical adenopathy.      Upper Body:      Right upper body: No supraclavicular adenopathy.      Left upper body: No supraclavicular adenopathy.   Skin:     General: Skin is warm.      Findings: No lesion or rash.   Neurological:      General: No focal deficit present.      Mental Status: He is alert and oriented to person, place, and time.      Cranial Nerves: Cranial nerves 2-12 are intact.      Motor: Motor function is intact.   Psychiatric:         Mood and Affect: Mood and affect normal.   Outpatient Follow-Up  No future appointments.      Lisa aPlm MD

## 2025-04-22 NOTE — DOCUMENTATION CLARIFICATION NOTE
"    PATIENT:               SHABBIR OAKES  ACCT #:                  [id number]  MRN:                       [id number]  :                       1935  ADMIT DATE:       4/15/2025 3:13 PM  DISCH DATE:        2025 5:14 PM  RESPONDING PROVIDER #:        [id number]          PROVIDER RESPONSE TEXT:    Pneumonia ruled out, bronchitis with mucus plugging    CDI QUERY TEXT:    Clarification    Instruction:    Based on your assessment of the patient and the clinical information, please provide the requested documentation by clicking on the appropriate radio button and enter any additional information if prompted.    Question: Please further specify the type of pneumonia being treated    When answering this query, please exercise your independent professional judgment. The fact that a question is being asked, does not imply that any particular answer is desired or expected.    The patient's clinical indicators include:  Clinical Information: 88 y/o male presented with SOB and chest discomfort, productive cough, wheezing.    Clinical Indicators: 4/15 CT angio chest for PE: Relatively dense and discrete filling defect in the bronchus intermedius that extends into the right middle and right lower lobe bronchi causing high-grade narrowing of the aforementioned bronchi as well as transient occlusion of the right middle lobe bronchus. There  is also partial obstruction of a subsegmental right middle lobe bronchus and obstruction of multiple right lower lobe segmental bronchi that eventually clear in the subsegmental branches. While the majority of this is likely either aspiration or dense mucous plugging, underlying endobronchial lesion contributing to backup of mucus is not excluded.    ED note: \"JOYA, Bronchitis, Mucus plug in respiratory tract\"  Discharge Summary:  \"Discharge Diagnosis: Mucus plugging.  SLP evaluated patient today for concerns of coughing and congestion. He was not found to have any signs of penetration or " "aspiration during their evaluation.  Patient was treated with steroids and doxycycline for possible pneumonia/ bronchitis. Patient was able to cough up mucus plug independently.\"    Treatment: 4/15 - 4/17 Doxycycline 100 mg IV q 12 hrs  4/15 - 4/17 Duonebs q 4hrs prn  4/15 - 4/17 Mucinex 600 mg po  BID    Risk Factors: former smoker, age  Options provided:  -- Aspiration PNA  -- Pneumonia ruled out, bronchitis with mucus plugging  -- Other - I will add my own diagnosis  -- Refer to Clinical Documentation Reviewer    Query created by: Avril Wolf on 4/21/2025 1:28 PM      Electronically signed by:  TADEO HOFFMAN MD 4/22/2025 6:26 PM          "

## 2025-05-05 ENCOUNTER — TELEPHONE (OUTPATIENT)
Dept: PULMONOLOGY | Facility: CLINIC | Age: OVER 89
End: 2025-05-05
Payer: MEDICARE

## 2025-05-05 NOTE — TELEPHONE ENCOUNTER
This patient was called and scheduled for sooner appointment on 5/13/25. Patient wanted both pulmonary appointments left for 5/13/25 and 7/2/25, just in case his  could not take him on 5/13/25.

## 2025-05-13 ENCOUNTER — APPOINTMENT (OUTPATIENT)
Dept: PULMONOLOGY | Facility: CLINIC | Age: OVER 89
End: 2025-05-13
Payer: MEDICARE

## 2025-05-13 ENCOUNTER — OFFICE VISIT (OUTPATIENT)
Dept: PULMONOLOGY | Facility: CLINIC | Age: OVER 89
End: 2025-05-13
Payer: MEDICARE

## 2025-05-13 VITALS
TEMPERATURE: 98.5 F | OXYGEN SATURATION: 99 % | HEART RATE: 71 BPM | DIASTOLIC BLOOD PRESSURE: 71 MMHG | WEIGHT: 173 LBS | BODY MASS INDEX: 25.62 KG/M2 | HEIGHT: 69 IN | RESPIRATION RATE: 18 BRPM | SYSTOLIC BLOOD PRESSURE: 121 MMHG

## 2025-05-13 DIAGNOSIS — T17.500A MUCUS PLUGGING OF BRONCHI: Primary | ICD-10-CM

## 2025-05-13 DIAGNOSIS — R09.82 POST-NASAL DRIP: ICD-10-CM

## 2025-05-13 PROCEDURE — 99215 OFFICE O/P EST HI 40 MIN: CPT | Performed by: INTERNAL MEDICINE

## 2025-05-13 PROCEDURE — 1111F DSCHRG MED/CURRENT MED MERGE: CPT | Performed by: INTERNAL MEDICINE

## 2025-05-13 PROCEDURE — 1036F TOBACCO NON-USER: CPT | Performed by: INTERNAL MEDICINE

## 2025-05-13 PROCEDURE — 1159F MED LIST DOCD IN RCRD: CPT | Performed by: INTERNAL MEDICINE

## 2025-05-13 PROCEDURE — 1160F RVW MEDS BY RX/DR IN RCRD: CPT | Performed by: INTERNAL MEDICINE

## 2025-05-13 ASSESSMENT — COPD QUESTIONNAIRES
QUESTION5_HOMEACTIVITIES: 0 - I AM NOT LIMITED DOING ANY ACTIVITIES AT HOME
QUESTION6_LEAVINGHOUSE: 0 - I AM CONFIDENT LEAVING MY HOME DESPITE MY LUNG CONDITION
QUESTION7_SLEEPQUALITY: 3
QUESTION8_ENERGYLEVEL: 1
CAT_TOTALSCORE: 7
QUESTION3_CHESTTIGHTNESS: 1
QUESTION1_COUGHFREQUENCY: 1
QUESTION2_CHESTPHLEGM: 0 - I HAVE NO PHLEGM (MUCUS) IN MY CHEST AT ALL
QUESTION4_WALKINCLINE: 1

## 2025-05-13 ASSESSMENT — ENCOUNTER SYMPTOMS
SHORTNESS OF BREATH: 0
FEVER: 0
FATIGUE: 1
COUGH: 0
UNEXPECTED WEIGHT CHANGE: 0

## 2025-05-13 NOTE — PROGRESS NOTES
"    Department of Medicine  Division of Pulmonary, Critical Care, and Sleep Medicine  Location  [facility], [address]    I was asked to evaluate Sonny Heart for hospital follow up. I have independently interviewed and examined the patient in the office and reviewed available records.     Physician HPI (5/13/2025):  89 y.o. male admitted to [facility] from 4/15/2025-4/17/2025 for mucous plugging secondary to suspected aspiration in his RML and RLL. He presented after developing worsening chest pain over 2 weeks.  SLP evaluated him and determined him to not have any signs of aspiration. He tested negative for SARS-CoV-2, influenza, a RSV. CTA ruled out P.E. Inpatient pulmonary service recommended doxycycline, guaifenesin, intranasal fluticasone, montelukast, 3% saline nebs. After receiving the hypertonic saline, he states he was coughing and vomiting a lot of secretions, and felt immediately better. He had a previous respiratory complication in 2022 after hip fx repair. After he was extubated, he was hypoxic and had an emergency bronchoscopy in PACU notable for \"clot removed from right bronchus.\" He was discharged home on guaifenesin with PFT order.    Prior to his admission, he was evaluated for sore throat in March, but did not receive any treatment as his strep antigen testing was negative.  He then followed up with ENT as outpatient and had a negative laryngoscopy.    He has a significant past medical history of HFpEF, SSS s/p PPM, legal blindness, DM2, CKD, chronic post-nasal drip. He is a former 15 pack/years smoker who quit 50+ years ago.    Today, he feels tired as he was working outside for most of the day yesterday.  He is using his flutter valve and incentive spirometer 3 times per day as instructed and is very compliant with Mucinex twice daily.  He denies any worsening sinus drainage or coughing. He denies any exertional dyspnea.  In the mornings he has a productive cough with lots of sinus " drainage which has been the norm for him for many years.    PMH:  Medical History[1]    PSH:  Surgical History[2]    FHx:  Family History[3]    Social Hx:  Social History[4]    Immunization History:  Immunization History   Administered Date(s) Administered    COVID-19, mRNA, LNP-S, PF, 30 mcg/0.3 mL dose 01/11/2021, 02/01/2021, 10/15/2021, 06/07/2022    Pfizer COVID-19 vaccine, 12 years and older, (30mcg/0.3mL) (Comirnaty) 11/08/2023, 10/09/2024    Pfizer COVID-19 vaccine, bivalent, age 12 years and older (30 mcg/0.3 mL) 12/28/2022       Current Medications:  Current Outpatient Medications   Medication Instructions    acetaminophen (TYLENOL) 650 mg, oral, Every 6 hours PRN    amiodarone (PACERONE) 200 mg, oral, Daily    amLODIPine (NORVASC) 10 mg, Daily    aspirin 81 mg, Daily    azelastine (Astelin) 137 mcg (0.1 %) nasal spray 1 spray, Each Nostril, 2 times daily, Use in each nostril as directed    cloNIDine (CATAPRES) 0.2 mg, 2 times daily    ezetimibe (ZETIA) 10 mg, oral, Daily    fenofibrate (TRICOR) 145 mg, Daily    fluticasone (Flonase) 50 mcg/actuation nasal spray 2 sprays, Each Nostril, Daily, Shake gently. Before first use, prime pump. After use, clean tip and replace cap.    furosemide (LASIX) 20 mg, Daily    guaiFENesin (MUCINEX) 600 mg, oral, 2 times daily, Do not crush, chew, or split.    hydrALAZINE (APRESOLINE) 25 mg, oral, 2 times daily    latanoprost (Xalatan) 0.005 % ophthalmic solution 1 drop, Right Eye, Nightly    levothyroxine (SYNTHROID, LEVOXYL) 88 mcg, oral, Daily before breakfast    lidocaine (Lidoderm) 5 % patch 2 patches, transdermal, Daily, Remove & discard patch within 12 hours or as directed by MD. To back     loratadine (CLARITIN) 10 mg, oral, Daily    losartan (COZAAR) 50 mg, oral, Daily    metoprolol succinate XL (TOPROL-XL) 25 mg, oral, Daily, Do not crush or chew.    miconazole (Micotin) 2 % powder 1 Application, Topical, Daily, To feet    montelukast (SINGULAIR) 10 mg, Nightly     "omeprazole (PRILOSEC) 40 mg, oral, 2 times daily, Do not crush or chew.    pioglitazone (ACTOS) 45 mg, Daily    potassium chloride CR 20 mEq ER tablet 20 mEq, oral, 2 times daily, Do not crush or chew.    traZODone (DESYREL) 150 mg, oral, Nightly        Drug Allergies/Intolerances:  RX Allergies[5]     Review of Systems:  Review of Systems   Constitutional:  Positive for fatigue. Negative for fever and unexpected weight change.   HENT:  Positive for congestion and postnasal drip.    Respiratory:  Negative for cough and shortness of breath.    Cardiovascular:  Negative for chest pain and leg swelling.        All other review of systems are negative and/or non-contributory.    Physical Examination:  Vitals:    05/13/25 0906   BP: 121/71   BP Location: Right arm   Patient Position: Sitting   Pulse: 71   Resp: 18   Temp: 36.9 °C (98.5 °F)   TempSrc: Temporal   SpO2: 99%   Weight: 78.5 kg (173 lb)   Height: 1.753 m (5' 9\")        GEN: appears well. Nasally sounding voice  ENT: Mallampati I, no mucus in oropharynx  CV: RRR, paced, no m/g/r  LUNGS: good effort, clear bilaterally, no w/r/r  EXT: no edema, cyanosis, clubbing      Exacerbation History      N/A    Pulmonary Function Test Results     none    Sleep Study     none    CAT and mMRC     N/A    Peak Flow and ACT     N/A    Chest Radiograph     XR chest 1 view 04/15/2025    Impression  No acute cardiopulmonary process.    12/23/2023:  No evidence of acute cardiopulmonary process.     Chest CT Scan     CT angio chest for pulmonary embolism 04/15/2025    LUNG, AIRWAYS, PLEURA: Emphysema. Mild bibasilar atelectasis. There  is a filling defect in the bronchus intermedius that extends into the  right middle and right lower lobe bronchi causing high-grade  narrowing of the aforementioned bronchi as well as transient  occlusion of the right middle lobe bronchus. There is also partial  obstruction of a subsegmental right middle lobe bronchus and  obstruction of multiple right " lower lobe segmental bronchi that  eventually clear in the subsegmental branches.    2. No acute pulmonary embolism.    3. Emphysema.     11/14/2022:  There is no pleural effusion, pleural thickening, or pneumothorax.   The airways are patent.     There are multiple bilateral consolidations and groundglass  attenuations, most severe is in the right lower lobe, representing  multifocal pneumonia.    Bronchoscopy     In 2022 in PACU. No documentation available.    Labs     Lab Results   Component Value Date    WBC 5.2 04/17/2025    HGB 9.1 (L) 04/17/2025    HCT 28.2 (L) 04/17/2025    MCV 96 04/17/2025     04/17/2025     Lab Results   Component Value Date     (H) 04/15/2025     Lab Results   Component Value Date    EOSABS 0.12 04/17/2025     Bicarbonate (mmol/L)   Date Value   04/17/2025 21   04/16/2025 23   04/15/2025 26       Echocardiogram     4/16/2025:   1. Left ventricular ejection fraction is normal, calculated by Cervantes's biplane at 54%.   2. Abnormal septal motion consistent with RV pacemaker.   3. Moderately elevated right ventricular systolic pressure 50.4mmHg.      ASSESSMENT & PLAN     Problem List Items Addressed This Visit       Mucus plugging of bronchi - Primary    Relevant Orders    CT chest wo IV contrast    Follow Up In Pulmonology    Post-nasal drip        Summary:  89 y.o. male with recent admission for mucous plugging. Passed SLP eval. Symptoms resolved after hypertonic saline. He is very compliant with IS, flutter valve, and Mucinex BID and is doing very well today.    Discussed options of further testing with 1) bronchoscopy 2) repeat CT scan 3) no further testing.  Patient is averse to having bronchoscopy due to potential complications.  He would like to proceed with CT scan, but he states he has multiple doctors appointments, and is dealing with a wife with advanced dementia, and would only be able to have a CT scan done in August.  Explained to him that it is impossible to  tell if he has an endobronchial lesion causing his mucous plugging without repeat testing.  He voiced understanding.    Plan:  -CT chest in August (per patient preference)  -Continue with Mucinex BID  -Continue with IS  -Continue with flutter valve  -Defer bronchoscopy for now until we have CT scan results.    Follow-up: 8/26/2025    Time Spent  Prep time on day of patient encounter: 10 minutes  Time spent directly with patient, family or caregiver: 40 minutes  Additional Time Spent on Patient Care Activities: 0 minutes  Documentation Time: 10 minutes  Other Time Spent: 0 minutes  Total: 60 minutes        Soila Barrow DO  Staff Physician - Pulmonary & Critical Care  05/13/25 9:09 AM  Office number: [phone]   Fax number:  [phone]          [1] No past medical history on file.  [2] No past surgical history on file.  [3] No family history on file.  [4]   Social History  Socioeconomic History    Marital status:    Tobacco Use    Smoking status: Former     Types: Cigarettes    Smokeless tobacco: Never     Social Drivers of Health     Financial Resource Strain: Low Risk  (4/16/2025)    Overall Financial Resource Strain (CARDIA)     Difficulty of Paying Living Expenses: Not hard at all   Food Insecurity: No Food Insecurity (4/16/2025)    Hunger Vital Sign     Worried About Running Out of Food in the Last Year: Never true     Ran Out of Food in the Last Year: Never true   Transportation Needs: No Transportation Needs (4/16/2025)    PRAPARE - Transportation     Lack of Transportation (Medical): No     Lack of Transportation (Non-Medical): No   Intimate Partner Violence: Not At Risk (4/15/2025)    Humiliation, Afraid, Rape, and Kick questionnaire     Fear of Current or Ex-Partner: No     Emotionally Abused: No     Physically Abused: No     Sexually Abused: No   Housing Stability: Low Risk  (4/16/2025)    Housing Stability Vital Sign     Unable to Pay for Housing in the Last Year: No     Number of Times  Moved in the Last Year: 0     Homeless in the Last Year: No   [5]   Allergies  Allergen Reactions    Atorvastatin Unknown and Myalgia    Codeine Nausea And Vomiting and Unknown     Blacked out after oral surgery and was given Codeine    Fenofibrate Nausea And Vomiting and Unknown    Gemfibrozil Nausea Only and Unknown    Lisinopril Unknown    Niacin Nausea And Vomiting and Unknown    Quinapril Nausea And Vomiting and Unknown    Quinine Unknown    Warfarin Hives and Unknown     Hx of macular degeneration, can not take blood thinners    Should not take due to blood clot in the eye      no

## 2025-06-30 ENCOUNTER — ANCILLARY PROCEDURE (OUTPATIENT)
Dept: URGENT CARE | Age: OVER 89
End: 2025-06-30
Payer: MEDICARE

## 2025-06-30 ENCOUNTER — OFFICE VISIT (OUTPATIENT)
Dept: URGENT CARE | Age: OVER 89
End: 2025-06-30
Payer: MEDICARE

## 2025-06-30 VITALS
SYSTOLIC BLOOD PRESSURE: 135 MMHG | TEMPERATURE: 98.7 F | RESPIRATION RATE: 16 BRPM | OXYGEN SATURATION: 97 % | DIASTOLIC BLOOD PRESSURE: 75 MMHG | HEART RATE: 77 BPM

## 2025-06-30 DIAGNOSIS — L03.031 CELLULITIS OF TOE OF RIGHT FOOT: ICD-10-CM

## 2025-06-30 DIAGNOSIS — T14.90XA INJURY: Primary | ICD-10-CM

## 2025-06-30 DIAGNOSIS — T14.90XA INJURY: ICD-10-CM

## 2025-06-30 PROCEDURE — 73660 X-RAY EXAM OF TOE(S): CPT | Mod: RIGHT SIDE

## 2025-06-30 RX ORDER — CHOLECALCIFEROL (VITAMIN D3) 50 MCG
50 TABLET ORAL
COMMUNITY
Start: 2025-05-02

## 2025-06-30 RX ORDER — TRAMADOL HYDROCHLORIDE 50 MG/1
TABLET, FILM COATED ORAL EVERY 6 HOURS PRN
COMMUNITY

## 2025-06-30 RX ORDER — CEPHALEXIN 500 MG/1
500 CAPSULE ORAL 4 TIMES DAILY
Qty: 28 CAPSULE | Refills: 0 | Status: SHIPPED | OUTPATIENT
Start: 2025-06-30 | End: 2025-07-07

## 2025-06-30 RX ORDER — NYSTATIN 100000 [USP'U]/G
POWDER TOPICAL
COMMUNITY

## 2025-06-30 RX ORDER — TALC
POWDER (GRAM) TOPICAL
COMMUNITY
Start: 2022-11-18

## 2025-06-30 RX ORDER — CLOTRIMAZOLE AND BETAMETHASONE DIPROPIONATE 10; .64 MG/G; MG/G
CREAM TOPICAL
COMMUNITY

## 2025-06-30 RX ORDER — FLUOCINOLONE ACETONIDE 0.1 MG/G
CREAM TOPICAL
COMMUNITY

## 2025-06-30 RX ORDER — NITROGLYCERIN 0.4 MG/1
TABLET SUBLINGUAL EVERY 5 MIN PRN
COMMUNITY

## 2025-06-30 RX ORDER — ALBUTEROL SULFATE 90 UG/1
INHALANT RESPIRATORY (INHALATION)
COMMUNITY
End: 2025-06-30 | Stop reason: WASHOUT

## 2025-06-30 RX ORDER — BUTALBITAL AND ACETAMINOPHEN 325; 50 MG/1; MG/1
TABLET ORAL
COMMUNITY
End: 2025-06-30 | Stop reason: WASHOUT

## 2025-06-30 RX ORDER — CARVEDILOL 25 MG/1
TABLET ORAL 2 TIMES DAILY
COMMUNITY

## 2025-06-30 RX ORDER — OFLOXACIN 3 MG/ML
SOLUTION/ DROPS OPHTHALMIC
COMMUNITY

## 2025-06-30 RX ORDER — AZELASTINE 1 MG/ML
SPRAY, METERED NASAL 2 TIMES DAILY
COMMUNITY

## 2025-06-30 RX ORDER — KETOCONAZOLE 20 MG/ML
SHAMPOO, SUSPENSION TOPICAL
COMMUNITY

## 2025-06-30 RX ORDER — ALBUTEROL SULFATE 90 UG/1
2 INHALANT RESPIRATORY (INHALATION) EVERY 4 HOURS PRN
COMMUNITY
Start: 2024-01-09

## 2025-06-30 RX ORDER — DULOXETIN HYDROCHLORIDE 30 MG/1
CAPSULE, DELAYED RELEASE ORAL
COMMUNITY

## 2025-06-30 RX ORDER — HYDROCORTISONE 25 MG/G
OINTMENT TOPICAL
COMMUNITY

## 2025-06-30 RX ORDER — ACETAMINOPHEN 325 MG/1
TABLET ORAL EVERY 4 HOURS PRN
COMMUNITY

## 2025-06-30 RX ORDER — ERGOCALCIFEROL 1.25 MG/1
1 CAPSULE ORAL
COMMUNITY
Start: 2024-02-14

## 2025-06-30 RX ORDER — AMIODARONE HYDROCHLORIDE 200 MG/1
1 TABLET ORAL DAILY
COMMUNITY

## 2025-06-30 ASSESSMENT — ENCOUNTER SYMPTOMS
ARTHRALGIAS: 1
WOUND: 1

## 2025-06-30 NOTE — PROGRESS NOTES
Subjective   Patient ID: Sonny Heart is a 89 y.o. male. They present today with a chief complaint of Toe Injury (Right great toe injury).    History of Present Illness  Patient is a 89-year-old male with extensive past medical history including emphysema, congestive heart failure, diabetes, osteoarthrosis and GERD who presents urgent care today with his daughter for complaint of right great toe pain.  He states approximately a week ago he hit his toe on his walker.  He has been ambulating since the incident but with some discomfort.  His daughter notes a abrasion on the top of his toe which she believes might be infected.  She has been trying to keep it clean and applying antibiotic ointment and states it is not any worse but it is not getting any better.  Patient denies any other complaints or concerns.      History provided by:  Patient and relative      Past Medical History  Allergies as of 06/30/2025 - Reviewed 05/13/2025   Allergen Reaction Noted    Atorvastatin Myalgia, Unknown, and Rash 01/28/2016    Gemfibrozil Nausea Only and Unknown 05/13/2010    Warfarin Hives and Unknown 02/14/2017    Codeine Nausea And Vomiting, Unknown, and Rash 06/21/2002    Fenofibrate Nausea And Vomiting, Unknown, and Rash 05/13/2010    Lisinopril Unknown and Rash 02/14/2017    Niacin Nausea And Vomiting, Unknown, and Rash 08/18/2015    Quinapril Nausea And Vomiting, Unknown, and Rash 07/26/2013    Quinine Unknown and Rash 12/08/2022       Prescriptions Prior to Admission[1]       Medical History[2]    Surgical History[3]     reports that he has quit smoking. His smoking use included cigarettes. He has never used smokeless tobacco. He reports that he does not currently use alcohol. He reports that he does not use drugs.    Review of Systems  Review of Systems   Musculoskeletal:  Positive for arthralgias.   Skin:  Positive for wound.                                  Objective    Vitals:    06/30/25 1410   BP: 135/75   Pulse: 77    Resp: 16   Temp: 37.1 °C (98.7 °F)   SpO2: 97%     No LMP for male patient.    Physical Exam  Vitals and nursing note reviewed.   Constitutional:       General: He is not in acute distress.     Appearance: Normal appearance. He is not ill-appearing, toxic-appearing or diaphoretic.   HENT:      Head: Normocephalic and atraumatic.      Mouth/Throat:      Mouth: Mucous membranes are moist.   Eyes:      Extraocular Movements: Extraocular movements intact.      Conjunctiva/sclera: Conjunctivae normal.      Pupils: Pupils are equal, round, and reactive to light.   Cardiovascular:      Rate and Rhythm: Normal rate and regular rhythm.      Pulses: Normal pulses.      Heart sounds: Normal heart sounds.   Pulmonary:      Effort: Pulmonary effort is normal. No respiratory distress.      Breath sounds: Normal breath sounds. No stridor. No wheezing, rhonchi or rales.   Chest:      Chest wall: No tenderness.   Musculoskeletal:         General: Tenderness and signs of injury present. No swelling or deformity. Normal range of motion.      Cervical back: Normal range of motion and neck supple.        Feet:    Skin:     General: Skin is warm and dry.      Capillary Refill: Capillary refill takes less than 2 seconds.      Findings: Erythema present.   Neurological:      General: No focal deficit present.      Mental Status: He is alert and oriented to person, place, and time.   Psychiatric:         Mood and Affect: Mood normal.         Behavior: Behavior normal.         Procedures      Assessment/Plan   Allergies, medications, history, and pertinent labs/EKGs/Imaging reviewed by SHEA Jensen.     Medical Decision Making    Patient is well appearing, afebrile, non toxic, not hypoxic, and appropriate for outpatient treatment and management at time of evaluation. Patient presents with ongoing, worsening right great toe pain.     Differential includes but not limited to: Fracture, abrasion, cellulitis, other    On exam,  patient has tenderness palpation to the right great toe.  There is a deep abrasion across the dorsal aspect of the toe just below the toenail.  Positive induration.  No fluctuance.  There appears to be a scant amount of  purulent drainage at the edge of the abrasion.  Patient is able to flex and extend the toe but with some discomfort.  Normal foot and ankle exam.  Patient is afebrile appears well-hydrated.  Vitals within normal limits.  Exam is otherwise unremarkable and as described above.    Wound was soaked in chlorhexidine rinse and thoroughly cleansed.  Xray ordered.  Image independently reviewed by myself and interpreted by radiology as no acute fracture or malalignment.  Advanced first MTP patient was provided with hardcopy of the radiology read.  Degenerative changes.  Bacitracin and bandage applied to wound.  Patient was provided with a prescription for Keflex to take as directed.  Offered to provide him with a referral to the wound clinic but he declined.  His daughter states he is going into assisted living in 2 weeks but she will monitor the wound and take him to his PCP if it does not improve.  He was given the opportunity ask questions.    Discussed return precautions and importance of follow-up. Advised to follow-up with PCP.  We spoke at length regarding the red flags he/she should be aware of and monitor for.  I specifically advised to go to the ED for changing or worsening symptoms, new symptoms, complaint specific precautions, and precautions listed on the discharge paperwork.     The plan of care was mutually agreed upon with the patient. All questions and concerns were answered to the best of my ability with today's information.  Patient was discharged in stable condition.    Dictation software was used in the creation of this note which does not evaluate or correct for typographical, spelling, syntax or grammatical errors.    Orders and Diagnoses  There are no diagnoses linked to this  encounter.    Medical Admin Record  === 06/30/25 ===    XR TOE RIGHT 2+ VIEWS    - Impression -  No acute fracture or malalignment.    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    Patient disposition: Home    Electronically signed by SHEA Jensen  2:17 PM       [1] (Not in a hospital admission)  [2]   Past Medical History:  Diagnosis Date    (HFpEF) heart failure with preserved ejection fraction     A-fib (Multi)     CKD (chronic kidney disease)     DM2 (diabetes mellitus, type 2) (Multi)     GERD (gastroesophageal reflux disease)     HLD (hyperlipidemia)     Macular degeneration     Post-nasal drip     SSS (sick sinus syndrome) (Multi)    [3]   Past Surgical History:  Procedure Laterality Date    CATARACT EXTRACTION Right 2016    PACEMAKER PLACEMENT  2000

## 2025-06-30 NOTE — PATIENT INSTRUCTIONS
You were seen at Urgent Care today and diagnosed with cellulitis. Please treat as discussed. Please take medications as prescribed. Monitor for red flags which we spoke about, If your symptoms change, worsen or become concerning in any way, please go to the emergency room immediately, otherwise you can followup with your PCP in 2-3 days as needed

## 2025-07-02 ENCOUNTER — APPOINTMENT (OUTPATIENT)
Dept: PULMONOLOGY | Facility: CLINIC | Age: OVER 89
End: 2025-07-02
Payer: MEDICARE

## 2025-07-07 ENCOUNTER — HOSPITAL ENCOUNTER (OUTPATIENT)
Dept: RADIOLOGY | Facility: CLINIC | Age: OVER 89
Discharge: HOME | End: 2025-07-07
Payer: MEDICARE

## 2025-07-07 DIAGNOSIS — T17.500A MUCUS PLUGGING OF BRONCHI: ICD-10-CM

## 2025-07-07 PROCEDURE — 71250 CT THORAX DX C-: CPT

## 2025-07-07 PROCEDURE — 71250 CT THORAX DX C-: CPT | Performed by: STUDENT IN AN ORGANIZED HEALTH CARE EDUCATION/TRAINING PROGRAM

## 2025-08-26 ENCOUNTER — OFFICE VISIT (OUTPATIENT)
Dept: PULMONOLOGY | Facility: CLINIC | Age: OVER 89
End: 2025-08-26
Payer: MEDICARE

## 2025-08-26 VITALS
DIASTOLIC BLOOD PRESSURE: 75 MMHG | HEART RATE: 78 BPM | HEIGHT: 69 IN | BODY MASS INDEX: 25.77 KG/M2 | WEIGHT: 174 LBS | RESPIRATION RATE: 18 BRPM | TEMPERATURE: 97.9 F | SYSTOLIC BLOOD PRESSURE: 135 MMHG | OXYGEN SATURATION: 98 %

## 2025-08-26 DIAGNOSIS — T17.500A MUCUS PLUGGING OF BRONCHI: ICD-10-CM

## 2025-08-26 PROCEDURE — 1159F MED LIST DOCD IN RCRD: CPT | Performed by: INTERNAL MEDICINE

## 2025-08-26 PROCEDURE — 1160F RVW MEDS BY RX/DR IN RCRD: CPT | Performed by: INTERNAL MEDICINE

## 2025-08-26 PROCEDURE — 99213 OFFICE O/P EST LOW 20 MIN: CPT | Performed by: INTERNAL MEDICINE

## 2025-08-26 PROCEDURE — 99212 OFFICE O/P EST SF 10 MIN: CPT | Performed by: INTERNAL MEDICINE
